# Patient Record
Sex: MALE | Employment: FULL TIME | URBAN - METROPOLITAN AREA
[De-identification: names, ages, dates, MRNs, and addresses within clinical notes are randomized per-mention and may not be internally consistent; named-entity substitution may affect disease eponyms.]

---

## 2017-04-13 ENCOUNTER — OFFICE VISIT (OUTPATIENT)
Dept: PEDIATRICS | Facility: CLINIC | Age: 32
End: 2017-04-13
Payer: COMMERCIAL

## 2017-04-13 VITALS
SYSTOLIC BLOOD PRESSURE: 110 MMHG | WEIGHT: 181.8 LBS | HEIGHT: 69 IN | HEART RATE: 80 BPM | DIASTOLIC BLOOD PRESSURE: 74 MMHG | RESPIRATION RATE: 20 BRPM | TEMPERATURE: 98.4 F | BODY MASS INDEX: 26.93 KG/M2

## 2017-04-13 DIAGNOSIS — Z00.00 ENCOUNTER FOR HEALTH MAINTENANCE EXAMINATION IN ADULT: Primary | ICD-10-CM

## 2017-04-13 DIAGNOSIS — R53.83 FATIGUE, UNSPECIFIED TYPE: ICD-10-CM

## 2017-04-13 DIAGNOSIS — F41.9 ANXIETY: ICD-10-CM

## 2017-04-13 LAB
ERYTHROCYTE [DISTWIDTH] IN BLOOD BY AUTOMATED COUNT: 12.8 % (ref 10–15)
HCT VFR BLD AUTO: 46.7 % (ref 40–53)
HGB BLD-MCNC: 16.2 G/DL (ref 13.3–17.7)
HIV 1+2 AB+HIV1 P24 AG SERPL QL IA: NORMAL
MCH RBC QN AUTO: 28.1 PG (ref 26.5–33)
MCHC RBC AUTO-ENTMCNC: 34.7 G/DL (ref 31.5–36.5)
MCV RBC AUTO: 81 FL (ref 78–100)
PLATELET # BLD AUTO: 200 10E9/L (ref 150–450)
RBC # BLD AUTO: 5.77 10E12/L (ref 4.4–5.9)
WBC # BLD AUTO: 5.4 10E9/L (ref 4–11)

## 2017-04-13 PROCEDURE — 80061 LIPID PANEL: CPT | Performed by: PEDIATRICS

## 2017-04-13 PROCEDURE — 87389 HIV-1 AG W/HIV-1&-2 AB AG IA: CPT | Performed by: PEDIATRICS

## 2017-04-13 PROCEDURE — 36415 COLL VENOUS BLD VENIPUNCTURE: CPT | Performed by: PEDIATRICS

## 2017-04-13 PROCEDURE — 87591 N.GONORRHOEAE DNA AMP PROB: CPT | Performed by: PEDIATRICS

## 2017-04-13 PROCEDURE — 80053 COMPREHEN METABOLIC PANEL: CPT | Performed by: PEDIATRICS

## 2017-04-13 PROCEDURE — 87491 CHLMYD TRACH DNA AMP PROBE: CPT | Performed by: PEDIATRICS

## 2017-04-13 PROCEDURE — 86780 TREPONEMA PALLIDUM: CPT | Performed by: PEDIATRICS

## 2017-04-13 PROCEDURE — 99385 PREV VISIT NEW AGE 18-39: CPT | Mod: GC | Performed by: INTERNAL MEDICINE

## 2017-04-13 PROCEDURE — 99213 OFFICE O/P EST LOW 20 MIN: CPT | Mod: 25 | Performed by: INTERNAL MEDICINE

## 2017-04-13 PROCEDURE — 85027 COMPLETE CBC AUTOMATED: CPT | Performed by: PEDIATRICS

## 2017-04-13 NOTE — LETTER
[facility]  [address] [facility] [address]                  [phone]   April 20, 2017    Peter Borden  [address]  [facility] [address]      Dear Peter,    Here is a summary of your recent test results:    Your STD testing is negative.    Your total cholesterol is elevated because your good cholesterol (HDL) is so high - this is good!    Your metabolic panel (electrolytes, liver and kidney function) is normal.    Your cell counts including your white count and hemoglobin are normal.    Your test results are enclosed.      Please contact me if you have any questions.           Thank you very much for choosing [facility]    Best regards,    Yanci Shelton MD        Results for orders placed or performed in visit on 04/13/17   Lipid Profile with reflex to direct LDL   Result Value Ref Range    Cholesterol 223 (H) <200 mg/dL    Triglycerides 149 <150 mg/dL    HDL Cholesterol 48 >39 mg/dL    LDL Cholesterol Calculated 145 (H) <100 mg/dL    Non HDL Cholesterol 175 (H) <130 mg/dL   Comprehensive metabolic panel (BMP + Alb, Alk Phos, ALT, AST, Total. Bili, TP)   Result Value Ref Range    Sodium 142 133 - 144 mmol/L    Potassium 4.6 3.4 - 5.3 mmol/L    Chloride 105 94 - 109 mmol/L    Carbon Dioxide 27 20 - 32 mmol/L    Anion Gap 10 3 - 14 mmol/L    Glucose 77 70 - 99 mg/dL    Urea Nitrogen 13 7 - 30 mg/dL    Creatinine 1.04 0.66 - 1.25 mg/dL    GFR Estimate 84 >60 mL/min/1.7m2    GFR Estimate If Black >90   GFR Calc   >60 mL/min/1.7m2    Calcium 9.6 8.5 - 10.1 mg/dL    Bilirubin Total 0.5 0.2 - 1.3 mg/dL    Albumin 4.6 3.4 - 5.0 g/dL    Protein Total 8.3 6.8 - 8.8 g/dL    Alkaline Phosphatase 50 40 - 150 U/L    ALT 20 0 - 70 U/L    AST 15 0 - 45 U/L   CBC with platelets   Result Value Ref Range    WBC 5.4 4.0 - 11.0 10e9/L    RBC Count 5.77 4.4 - 5.9 10e12/L    Hemoglobin 16.2 13.3 - 17.7 g/dL    Hematocrit 46.7 40.0 -  53.0 %    MCV 81 78 - 100 fl    MCH 28.1 26.5 - 33.0 pg    MCHC 34.7 31.5 - 36.5 g/dL    RDW 12.8 10.0 - 15.0 %    Platelet Count 200 150 - 450 10e9/L   HIV Antigen Antibody Combo   Result Value Ref Range    HIV Antigen Antibody Combo  NR     Nonreactive   HIV-1 p24 Ag & HIV-1/HIV-2 Ab Not Detected     Anti Treponema   Result Value Ref Range    Treponema pallidum Antibody Negative NEG   NEISSERIA GONORRHOEA PCR   Result Value Ref Range    Specimen Descrip Urine     N Gonorrhea PCR  NEG     Negative   Negative for N. gonorrhoeae rRNA by transcription mediated amplification.   A negative result by transcription mediated amplification does not preclude the   presence of N. gonorrhoeae infection because results are dependent on proper   and adequate collection, absence of inhibitors, and sufficient rRNA to be   detected.   Test delayed due to technical problems.     CHLAMYDIA TRACHOMATIS PCR   Result Value Ref Range    Specimen Description Urine     Chlamydia Trachomatis PCR  NEG     Negative   Negative for C. trachomatis rRNA by transcription mediated amplification.   A negative result by transcription mediated amplification does not preclude the   presence of C. trachomatis infection because results are dependent on proper   and adequate collection, absence of inhibitors, and sufficient rRNA to be   detected.   Test delayed due to technical problems.

## 2017-04-13 NOTE — NURSING NOTE
"Chief Complaint   Patient presents with     Physical       Initial /74  Pulse 80  Temp 98.4  F (36.9  C) (Oral)  Resp 20  Ht 5' 9\" (1.753 m)  Wt 181 lb 12.8 oz (82.5 kg)  BMI 26.85 kg/m2 Estimated body mass index is 26.85 kg/(m^2) as calculated from the following:    Height as of this encounter: 5' 9\" (1.753 m).    Weight as of this encounter: 181 lb 12.8 oz (82.5 kg).  Medication Reconciliation: complete   Claudia J, CMA,AAMA      "

## 2017-04-13 NOTE — PROGRESS NOTES
SUBJECTIVE:     CC: Peter Borden is an 31 year old male who presents for preventative health visit.     Physical   Annual:     Getting at least 3 servings of Calcium per day::  Yes    Bi-annual eye exam::  NO    Dental care twice a year::  NO    Sleep apnea or symptoms of sleep apnea::  None    Diet::  Regular (no restrictions)    Frequency of exercise::  None    Taking medications regularly::  Yes    Medication side effects::  None    Additional concerns today::  No          Other concerns to discuss in clinic today are as follow:    1.Not sleeping good at hs. Wakes up early  2. Tired all day due to poor sleep  - Can't sleep more than 5 hours  - Goes to bed at 11, wakes up at 5:30 am. Wakes up around 2 am every night, usually able to fall back asleep until 5:30.   - Starts work at 10 am, always feels sleepy  - When he wakes up, sometimes he feels his mind wanders to all of life plans, nervousness, etc but not always.   - Feels like he is sneezing every morning when he wakes up. Doesn't feel congested in the night or like it bothers him in the night  - Problem started about 5 months ago  - Previously had a gym membership, would usually work out after work.  Stopped this in November.  May have been helping his sleep  - Does not drink caffeine regularly (pop, coffee, etc)  - No TV before bed  - Pretty consistent with his schedule  - No alcohol   - Does snore, not sure how much, but will occasionally wake himself up from it.  Changes positions and thinks this makes it better.   - Does have frequent headaches, diffuse, no vision or other neuro changes     3. 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 Once he is able to get things organized, he feels much better  - Also nervous about general life goals.  Has had a lot of different jobs and is not currently working in his field of study, unsure what he wants to do.  May want to go into physical therapy which would be a whole new path.    - Also nervous as he doesn't have family here and didn't initially know many people, this is getting better       Today's PHQ-2 Score:   PHQ-2 ( 1999 [employer]) 4/13/2017   Little interest or pleasure in doing things Several days   Feeling down, depressed or hopeless Several days   PHQ-2 Score 2       Abuse: Current or Past(Physical, Sexual or Emotional)- No  Do you feel safe in your environment - Yes    Social History   Substance Use Topics     Smoking status: Never Smoker     Smokeless tobacco: Not on file     Alcohol use Yes      Comment: social     The patient does not drink >3 drinks per day nor >7 drinks per week.    Last PSA: No results found for: PSA    No results for input(s): CHOL, HDL, LDL, TRIG, CHOLHDLRATIO, NHDL in the last 55182 hours.    Reviewed orders with patient. Reviewed health maintenance and updated orders accordingly - Yes    Reviewed and updated as needed this visit by clinical staff  Tobacco  Allergies  Meds  Med Hx  Surg Hx  Fam Hx  Soc Hx        Reviewed and updated as needed this visit by Provider        Past Medical History:   Diagnosis Date     NO ACTIVE PROBLEMS       Past Surgical History:   Procedure Laterality Date     NO HISTORY OF SURGERY          No past medical history    No past surgical history    No regular medications. Takes ibuprofen prn. No herbs, supplements, vitamins.     No known allergies.     Social History  Originally from [place]. 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   [place]. 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   Wants to go into physical therapy.   Currently sexually active.  Female only. Do not  "use protection, no history of STIs.    Never tobacco user.  No marijuana, no other illicit drugs.   Social alcohol use, minimal.     ROS:  10 point ROS obtained, negative except as noted above in HPI     Problem list, Medication list, Allergies, and Medical/Social/Surgical histories reviewed in [facility] and updated as appropriate.  OBJECTIVE:     /74  Pulse 80  Temp 98.4  F (36.9  C) (Oral)  Resp 20  Ht 5' 9\" (1.753 m)  Wt 181 lb 12.8 oz (82.5 kg)  BMI 26.85 kg/m2  EXAM:  GENERAL: healthy, alert and no distress  HEENT: NC/AT, EOMI, no scleral icterus, PERRL, no conjunctivitis, MMM, no oral lesions. Tonsils 2+   NECK: no adenopathy, no asymmetry, masses, or scars and thyroid normal to palpation  RESP: lungs clear to auscultation - no rales, rhonchi or wheezes  CV: regular rate and rhythm, normal S1 S2, no S3 or S4, no murmur, click or rub, no peripheral edema and peripheral pulses strong  ABDOMEN: soft, nontender, no hepatosplenomegaly, no masses and bowel sounds normal  SKIN: No rashes or lesions   MS: no gross musculoskeletal defects noted, no edema  NEURO: Alert, oriented x 3, normal gait, normal speech, CNs intact  PSYCH: Normal mood and full congruent affect, not anxious, well groomed, normal speech and thought process    ASSESSMENT/PLAN:     (Z00.00) Encounter for health maintenance examination in adult  (primary encounter diagnosis)  Plan: Lipid Profile with reflex to direct LDL,         Comprehensive metabolic panel (BMP + Alb, Alk         Phos, ALT, AST, Total. Bili, TP), NEISSERIA         GONORRHOEA PCR, CHLAMYDIA TRACHOMATIS PCR, HIV         Antigen Antibody Combo, Anti Treponema    (R53.83) Fatigue, unspecified type  Comment: Has fairly good sleep hygiene but primary concern when awakening does seem to be anxiety which has also persisted during the daytime as well.  Does occasionally wake himself from sleep, with fairly normal oral airway and not obese, sleep apnea is less likely but discussed " "ruling it out with a sleep study if he felt inclined and he opted to have this scheduled.  He was worried about anemia but has no risk factors for this and believe this is less likely.  See management of anxiety below.  Believe regulasr exercise routine will help with anxiety in addition to counseling, which will all help with sleep.    Plan: CBC with platelets, SLEEP EVALUATION &         MANAGEMENT REFERRAL - ADULT           (F41.9) Anxiety  Comment: Discuss meds and/or therapy and/or importance of exercise and good sleep.  As this has never been a problem before and because it started after he got out of his normal workout routine, seems precipitated by recent job, we discussed first starting with getting back to regulsr exercise routine and considering counseling.  Discussed risk and benefits of meds and he chose to hold on this for now, which I think is reasonable. Will see back in follow up in about 4 weeks to see how this plan if working and if we need to start medication.    Plan: MENTAL HEALTH REFERRAL      COUNSELING:   Reviewed preventive health counseling, as reflected in patient instructions  Special attention given to:        Regular exercise       Healthy diet/nutrition       Safe sex practices/STD prevention       HIV screeninx in teen years, 1x in adult years, and at intervals if high risk         reports that he has never smoked. He does not have any smokeless tobacco history on file.    Estimated body mass index is 26.85 kg/(m^2) as calculated from the following:    Height as of this encounter: 5' 9\" (1.753 m).    Weight as of this encounter: 181 lb 12.8 oz (82.5 kg).       Counseling Resources:  ATP IV Guidelines  Pooled Cohorts Equation Calculator  FRAX Risk Assessment  ICSI Preventive Guidelines  Dietary Guidelines for Americans,   USDA's MyPlate  ASA Prophylaxis  Lung CA Screening    D/w Dr. Nikita Gonzalez MD  [facility]  Answers for HPI/ROS submitted by the " patient on 4/13/2017   Q1: Little interest or pleasure in doing things: 1=Several days  Q2: Feeling down, depressed or hopeless: 1=Several days  PHQ-2 Score: 2    Attestation:    I have seen above patient and reviewed the documentation from Dr. Gonzalez and discussed the findings with Dr. Gonzalez. I agree with the documentation of Dr. Gonzalez.    Yanci Shelton MD  Internal Medicine/Pediatrics  [facility]

## 2017-04-13 NOTE — PATIENT INSTRUCTIONS
1. For the sleep issues and fatigue -   - I am checking some basic labs including a hemoglobin which will look for anemia. Will call you with results.   - I placed a referral for a sleep study. They will call you to schedule.   - Try melatonin (over the counter) 3-6 mg (1-2 tabs usually) at night when you wake up or right before going to sleep at the beginning of the night    2. For the nervousness,  - Try to get back into a regular exercise routine  - I placed a referral for counseling to help with you a few coping stratetgies, sleep techniques, and to help sort out overall life goals.    - Let me know if this isn't working after a few weeks    3. For general physical  - I am checking for STDs  - I am checking your cholesterol   - I am checking basic kidney and liver labs  - Will call with results

## 2017-04-13 NOTE — MR AVS SNAPSHOT
After Visit Summary   4/13/2017    Peter Borden    MRN: [id number]           Patient Information     Date Of Birth          1985        Visit Information        Provider Department      4/13/2017 8:15 AM Shakila Gonzalez [facility]        Today's Diagnoses     Encounter for health maintenance examination in adult    -  1    Fatigue, unspecified type        Anxiety          Care Instructions    1. For the sleep issues and fatigue -   - I am checking some basic labs including a hemoglobin which will look for anemia. Will call you with results.   - I placed a referral for a sleep study. They will call you to schedule.   - Try melatonin (over the counter) 3-6 mg (1-2 tabs usually) at night when you wake up or right before going to sleep at the beginning of the night    2. For the nervousness,  - Try to get back into a regular exercise routine  - I placed a referral for counseling to help with you a few coping stratetgies, sleep techniques, and to help sort out overall life goals.    - Let me know if this isn't working after a few weeks    3. For general physical  - I am checking for STDs  - I am checking your cholesterol   - I am checking basic kidney and liver labs  - Will call with results         Follow-ups after your visit        Additional Services     MENTAL HEALTH REFERRAL       Your provider has referred you to: [facility]: [facility] - Counseling (Individual/Couples/Family) - [facility] [phone]   [email or web address]   *Patient will be contacted by [facility] scheduling partner[facility]), to schedule an appointment.  Patients may also call [facility] to schedule.    All scheduling is subject to the client's specific insurance plan & benefits, provider/location availability, and provider clinical specialities.  Please arrive 15 minutes early for your first appointment and  "bring your completed paperwork.    Please be aware that coverage of these services is subject to the terms and limitations of your health insurance plan.  Call member services at your health plan with any benefit or coverage questions.            SLEEP EVALUATION & MANAGEMENT REFERRAL - ADULT       Please be aware that coverage of these services is subject to the terms and limitations of your health insurance plan.  Call member services at your health plan with any benefit or coverage questions.      Please bring the following to your appointment:    >>   List of current medications   >>   This referral request   >>   Any documents/labs given to you for this referral    [facility] [phone] (Age 18 and up)                  Future tests that were ordered for you today     Open Future Orders        Priority Expected Expires Ordered    SLEEP EVALUATION & MANAGEMENT REFERRAL - ADULT Routine  4/13/2018 4/13/2017            Who to contact     If you have questions or need follow up information about today's clinic visit or your schedule please contact [facility] directly at [phone].  Normal or non-critical lab and imaging results will be communicated to you by Releadhart, letter or phone within 4 business days after the clinic has received the results. If you do not hear from us within 7 days, please contact the clinic through Releadhart or phone. If you have a critical or abnormal lab result, we will notify you by phone as soon as possible.  Submit refill requests through Empressr or call your pharmacy and they will forward the refill request to us. Please allow 3 business days for your refill to be completed.          Additional Information About Your Visit        Empressr Information     Empressr lets you send messages to your doctor, view your test results, renew your prescriptions, schedule appointments and more. To sign up, go to [email or web address] . Click on \"Log in\" on " "the left side of the screen, which will take you to the Welcome page. Then click on \"Sign up Now\" on the right side of the page.     You will be asked to enter the access code listed below, as well as some personal information. Please follow the directions to create your username and password.     Your access code is: [email or web address]  Expires: 2017  9:26 AM     Your access code will  in 90 days. If you need help or a new code, please call your [place] clinic or [phone].        Care EveryWhere ID     This is your Care EveryWhere ID. This could be used by other organizations to access your [place] medical records  [id number]        Your Vitals Were     Pulse Temperature Respirations Height BMI (Body Mass Index)       80 98.4  F (36.9  C) (Oral) 20 5' 9\" (1.753 m) 26.85 kg/m2        Blood Pressure from Last 3 Encounters:   17 110/74    Weight from Last 3 Encounters:   17 181 lb 12.8 oz (82.5 kg)              We Performed the Following     Anti Treponema     CBC with platelets     CHLAMYDIA TRACHOMATIS PCR     Comprehensive metabolic panel (BMP + Alb, Alk Phos, ALT, AST, Total. Bili, TP)     HIV Antigen Antibody Combo     Lipid Profile with reflex to direct LDL     MENTAL HEALTH REFERRAL     NEISSERIA GONORRHOEA PCR        Primary Care Provider    None Specified       No primary provider on file.        Thank you!     Thank you for choosing [facility]  for your care. Our goal is always to provide you with excellent care. Hearing back from our patients is one way we can continue to improve our services. Please take a few minutes to complete the written survey that you may receive in the mail after your visit with us. Thank you!             Your Updated Medication List - Protect others around you: Learn how to safely use, store and throw away your medicines at www.disposemymeds.org.      Notice  As of 2017  9:27 AM    You have not been prescribed any medications.      "

## 2017-04-14 LAB
ALBUMIN SERPL-MCNC: 4.6 G/DL (ref 3.4–5)
ALP SERPL-CCNC: 50 U/L (ref 40–150)
ALT SERPL W P-5'-P-CCNC: 20 U/L (ref 0–70)
ANION GAP SERPL CALCULATED.3IONS-SCNC: 10 MMOL/L (ref 3–14)
AST SERPL W P-5'-P-CCNC: 15 U/L (ref 0–45)
BILIRUB SERPL-MCNC: 0.5 MG/DL (ref 0.2–1.3)
BUN SERPL-MCNC: 13 MG/DL (ref 7–30)
CALCIUM SERPL-MCNC: 9.6 MG/DL (ref 8.5–10.1)
CHLORIDE SERPL-SCNC: 105 MMOL/L (ref 94–109)
CHOLEST SERPL-MCNC: 223 MG/DL
CO2 SERPL-SCNC: 27 MMOL/L (ref 20–32)
CREAT SERPL-MCNC: 1.04 MG/DL (ref 0.66–1.25)
GFR SERPL CREATININE-BSD FRML MDRD: 84 ML/MIN/1.7M2
GLUCOSE SERPL-MCNC: 77 MG/DL (ref 70–99)
HDLC SERPL-MCNC: 48 MG/DL
LDLC SERPL CALC-MCNC: 145 MG/DL
NONHDLC SERPL-MCNC: 175 MG/DL
POTASSIUM SERPL-SCNC: 4.6 MMOL/L (ref 3.4–5.3)
PROT SERPL-MCNC: 8.3 G/DL (ref 6.8–8.8)
SODIUM SERPL-SCNC: 142 MMOL/L (ref 133–144)
T PALLIDUM IGG+IGM SER QL: NEGATIVE
TRIGL SERPL-MCNC: 149 MG/DL

## 2017-04-14 ASSESSMENT — PATIENT HEALTH QUESTIONNAIRE - PHQ9: SUM OF ALL RESPONSES TO PHQ QUESTIONS 1-9: 8

## 2017-04-17 ENCOUNTER — TELEPHONE (OUTPATIENT)
Dept: PEDIATRICS | Facility: CLINIC | Age: 32
End: 2017-04-17

## 2017-04-17 NOTE — TELEPHONE ENCOUNTER
Reason for Call:  Request for results:    Name of test or procedure: CBC, Lipids, STD testing    Date of test of procedure: 4/13/17    Location of the test or procedure:  [place] to leave the result message on voice mail or with a family member? YES    Phone number Patient can be reached at:  Home number on file [phone] (home)    Additional comments: Please call patient with results    Mary Meredith,   [facility]

## 2017-04-18 LAB
C TRACH DNA SPEC QL NAA+PROBE: NORMAL
N GONORRHOEA DNA SPEC QL NAA+PROBE: NORMAL
SPECIMEN SOURCE: NORMAL
SPECIMEN SOURCE: NORMAL

## 2017-05-22 PROBLEM — F41.9 ANXIETY: Status: ACTIVE | Noted: 2017-05-22

## 2017-12-14 ENCOUNTER — OFFICE VISIT (OUTPATIENT)
Dept: PEDIATRICS | Facility: CLINIC | Age: 32
End: 2017-12-14
Payer: COMMERCIAL

## 2017-12-14 VITALS
TEMPERATURE: 98 F | DIASTOLIC BLOOD PRESSURE: 66 MMHG | WEIGHT: 172.3 LBS | BODY MASS INDEX: 25.52 KG/M2 | HEIGHT: 69 IN | HEART RATE: 65 BPM | SYSTOLIC BLOOD PRESSURE: 118 MMHG | OXYGEN SATURATION: 99 %

## 2017-12-14 DIAGNOSIS — N50.812 TESTICULAR PAIN, LEFT: ICD-10-CM

## 2017-12-14 DIAGNOSIS — Z23 NEED FOR PROPHYLACTIC VACCINATION AND INOCULATION AGAINST INFLUENZA: Primary | ICD-10-CM

## 2017-12-14 LAB
ALBUMIN UR-MCNC: NEGATIVE MG/DL
APPEARANCE UR: CLEAR
BILIRUB UR QL STRIP: NEGATIVE
COLOR UR AUTO: YELLOW
GLUCOSE UR STRIP-MCNC: NEGATIVE MG/DL
HGB UR QL STRIP: NEGATIVE
KETONES UR STRIP-MCNC: NEGATIVE MG/DL
LEUKOCYTE ESTERASE UR QL STRIP: NEGATIVE
NITRATE UR QL: NEGATIVE
PH UR STRIP: 8 PH (ref 5–7)
SOURCE: ABNORMAL
SP GR UR STRIP: 1.01 (ref 1–1.03)
UROBILINOGEN UR STRIP-ACNC: 0.2 EU/DL (ref 0.2–1)

## 2017-12-14 PROCEDURE — 90686 IIV4 VACC NO PRSV 0.5 ML IM: CPT | Performed by: INTERNAL MEDICINE

## 2017-12-14 PROCEDURE — 87491 CHLMYD TRACH DNA AMP PROBE: CPT | Performed by: PEDIATRICS

## 2017-12-14 PROCEDURE — 87591 N.GONORRHOEAE DNA AMP PROB: CPT | Performed by: PEDIATRICS

## 2017-12-14 PROCEDURE — 90471 IMMUNIZATION ADMIN: CPT | Performed by: INTERNAL MEDICINE

## 2017-12-14 PROCEDURE — 99213 OFFICE O/P EST LOW 20 MIN: CPT | Mod: GE | Performed by: STUDENT IN AN ORGANIZED HEALTH CARE EDUCATION/TRAINING PROGRAM

## 2017-12-14 PROCEDURE — 87389 HIV-1 AG W/HIV-1&-2 AB AG IA: CPT | Performed by: PEDIATRICS

## 2017-12-14 PROCEDURE — 81003 URINALYSIS AUTO W/O SCOPE: CPT | Performed by: INTERNAL MEDICINE

## 2017-12-14 PROCEDURE — 36415 COLL VENOUS BLD VENIPUNCTURE: CPT | Performed by: PEDIATRICS

## 2017-12-14 NOTE — MR AVS SNAPSHOT
After Visit Summary   12/14/2017    Peter Borden    MRN: [id number]           Patient Information     Date Of Birth          1985        Visit Information        Provider Department      12/14/2017 9:15 AM Taco Smith MD [facility]        Today's Diagnoses     Need for prophylactic vaccination and inoculation against influenza    -  1    Testicular pain, left          Care Instructions    - HIV, gonorrhea, and chlamydia test have been ordered; for gonorrhea and chlamydia test, please wait another 30 minutes to obtain the dirty urine sample  - will call or message you with lab results  - monitor left testicular pain for next 2-3 weeks; if not improving, might have to consider ultrasound or re-evaluate at clinic  - can take tylenol or ibuprofen as needed for pain      Testicular Pain, Unclear Cause  You have had pain in one or both testicles. Based on your exam today, the exact cause of your pain is not certain. But your condition does not appear to be dangerous. Testicles are very sensitive. Even a small injury can cause quite a bit of pain. Other possible causes of testicular pain include kidney stones, cysts, mumps, inflammatory conditions, chronic conditions, hernia, infection, and a twisted testicle.  Certain tests may be done to rule out an underlying problem causing the pain. Nothing conclusive was found today. Most likely, the pain will go away on its own. If it doesn t, you may need more tests.    Home care  Medicine may be prescribed to help relieve pain and swelling. This may be an over-the-counter pain reliever or prescription pain medication. Take all medicine as directed.  The following are general care guidelines:    To relieve pain and swelling, apply an ice pack wrapped in a thin towel for 10 minutes at a time. Continue this on and off for 1 to 2 days.    When lying down, place a small rolled towel under your scrotum. When moving around, wear a jockstrap  (athletic supporter) or supportive underwear. These will help support and protect your testicles.    If it hurts to walk, walk as little as possible until you feel better.    Avoid strenuous activity until you feel better.    Do not have sex until you feel better.    If you have severe pain in the testicle, seek care right away. Delay may lead to permanent loss of the testicle s function.  Follow-up care  Follow up with your healthcare provider, or as advised.  When to seek medical advice  Call your healthcare provider right away if any of these occur:    Fever of 100.4 F (38 C) or higher    Worsening of the pain or severe pain    Swelling of the testicle or scrotum    A lump in the scrotum    Warm and red scrotum (signs of infection)    Nausea and vomiting    Pain or swelling in abdomen    Trouble urinating    Numbness or weakness in the leg    Shrinking of the testicle    Blood in your urine  Date Last Reviewed: 10/1/2016    2448-7228 [employer]. [address] [place] [address]. All rights reserved. This information is not intended as a substitute for professional medical care. Always follow your healthcare professional's instructions.                Follow-ups after your visit        Follow-up notes from your care team     Return if symptoms worsen or fail to improve.      Who to contact     If you have questions or need follow up information about today's clinic visit or your schedule please contact [facility] directly at [phone].  Normal or non-critical lab and imaging results will be communicated to you by MyChart, letter or phone within 4 business days after the clinic has received the results. If you do not hear from us within 7 days, please contact the clinic through MyChart or phone. If you have a critical or abnormal lab result, we will notify you by phone as soon as possible.  Submit refill requests through [employer] or call your pharmacy and they will forward the  "refill request to us. Please allow 3 business days for your refill to be completed.          Additional Information About Your Visit        MyChart Information     [employer] lets you send messages to your doctor, view your test results, renew your prescriptions, schedule appointments and more. To sign up, go to [email or web address] . Click on \"Log in\" on the left side of the screen, which will take you to the Welcome page. Then click on \"Sign up Now\" on the right side of the page.     You will be asked to enter the access code listed below, as well as some personal information. Please follow the directions to create your username and password.     Your access code is: [email or web address]  Expires: 3/14/2018 10:15 AM     Your access code will  in 90 days. If you need help or a new code, please call your [facility] clinic or [phone].        Care EveryWhere ID     This is your Care EveryWhere ID. This could be used by other organizations to access your [facility] medical records  [id number]        Your Vitals Were     Pulse Temperature Height Pulse Oximetry BMI (Body Mass Index)       65 98  F (36.7  C) (Oral) 5' 9\" (1.753 m) 99% 25.44 kg/m2        Blood Pressure from Last 3 Encounters:   17 118/66   17 110/74    Weight from Last 3 Encounters:   17 172 lb 4.8 oz (78.2 kg)   17 181 lb 12.8 oz (82.5 kg)              We Performed the Following     CHLAMYDIA TRACHOMATIS PCR     FLU VAC, SPLIT VIRUS IM > 3 YO (QUADRIVALENT) [74727]     HIV Antigen Antibody Combo     NEISSERIA GONORRHOEA PCR     UA reflex to Microscopic and Culture     Vaccine Administration, Initial [[id number]]        Primary Care Provider Office Phone # Fax #    Yanci Shelton [phone] [place] [phone]        Equal Access to Services     [facility] SHANKAR AH: Hadii aad ku hadasho Soomaali, waaxda luqadaha, qaybta kaalmada sarahyakeya, farooq awad ah. So [facility] " [phone].    ATENCIÓN: Si habla yonis, tiene a martins disposición servicios gratuitos de asistencia lingüística. Llame al [phone].    We comply with applicable federal civil rights laws and [place] laws. We do not discriminate on the basis of race, color, national origin, age, disability, sex, sexual orientation, or gender identity.            Thank you!     Thank you for choosing [facility] [place]  for your care. Our goal is always to provide you with excellent care. Hearing back from our patients is one way we can continue to improve our services. Please take a few minutes to complete the written survey that you may receive in the mail after your visit with us. Thank you!             Your Updated Medication List - Protect others around you: Learn how to safely use, store and throw away your medicines at www.disposemymeds.org.      Notice  As of 12/14/2017 10:15 AM    You have not been prescribed any medications.

## 2017-12-14 NOTE — NURSING NOTE
"Chief Complaint   Patient presents with     Testicular/scrotal Pain       Initial /66  Pulse 65  Temp 98  F (36.7  C) (Oral)  Ht 5' 9\" (1.753 m)  Wt 172 lb 4.8 oz (78.2 kg)  SpO2 99%  BMI 25.44 kg/m2 Estimated body mass index is 25.44 kg/(m^2) as calculated from the following:    Height as of this encounter: 5' 9\" (1.753 m).    Weight as of this encounter: 172 lb 4.8 oz (78.2 kg).  Medication Reconciliation: complete   Marisa Rowland MA      "

## 2017-12-14 NOTE — PROGRESS NOTES
SUBJECTIVE:   Peter Borden is a 32 year old male who presents to clinic today for the following health issues:      Testicular Pain      Duration: 1-2 weeks     Description (location/character/radiation): Left testicular    Intensity:  moderate    Accompanying signs and symptoms: discomfort, left side groin and around the back, prolonged standing causes pain, tender to touch,     History (similar episodes/previous evaluation): None    Precipitating or alleviating factors: prolonged standing causes pain    Therapies tried and outcome: Took Amoxicillin      Peter is a   Left testicular pain since 2 weeks ago, after gym abdominal exercise, located in left testicular and radiate to left side groin and around the back, crampy characteristics, getting better after sexual encounter 1 week ago, has been taking amoxicillin (10 days starting 2 weeks ago from previously prescribed), no change in pain with position, no other meds, no past STI, uses condom regularly, no fever, no rash, no  or GI symtpoms    Problem list and histories reviewed & adjusted, as indicated.  Additional history: as documented    Patient Active Problem List   Diagnosis     Anxiety     Past Surgical History:   Procedure Laterality Date     NO HISTORY OF SURGERY         Social History   Substance Use Topics     Smoking status: Never Smoker     Smokeless tobacco: Never Used     Alcohol use Yes      Comment: social, minimal      History reviewed. No pertinent family history.      No current outpatient prescriptions on file.     No Known Allergies      Reviewed and updated as needed this visit by clinical staffTobacco  Allergies  Meds  Problems  Med Hx  Surg Hx  Fam Hx  Soc Hx        Reviewed and updated as needed this visit by Provider  Allergies  Meds  Problems         ROS:  C: NEGATIVE for fever  I: NEGATIVE for worrisome rashes  GI: NEGATIVE for nausea, abdominal pain, heartburn, or change in bowel habits  : NEGATIVE for dysuria or  "hematuria    OBJECTIVE:     /66  Pulse 65  Temp 98  F (36.7  C) (Oral)  Ht 5' 9\" (1.753 m)  Wt 172 lb 4.8 oz (78.2 kg)  SpO2 99%  BMI 25.44 kg/m2  Body mass index is 25.44 kg/(m^2).     GENERAL: healthy, alert and no distress  EYES: Eyes grossly normal to inspection, conjunctivae and sclerae normal  ABDOMEN: soft, nontender abdomen  : No femoral or inguinal hernia, anterior scrotal mild tenderness on left side but otherwise non-tender right scrotum, no epididymal tenderness, no penile discharge,   MS: no gross musculoskeletal defects noted, no edema  SKIN: no suspicious lesions or rashes  NEURO: Normal strength and tone, mentation intact and speech normal  PSYCH: mentation appears normal, affect normal/bright    Diagnostic Test Results:  Urinalysis - negative    ASSESSMENT/PLAN:   1. Need for prophylactic vaccination and inoculation against influenza  - FLU VAC, SPLIT VIRUS IM > 3 YO (QUADRIVALENT) [[id number]]  - Vaccine Administration, Initial [[id number]]    2. Testicular pain, left  Complains of 2 wk hx of left testicular pain soon after abdominal exercises. Now slowly improving. Physical exam shows very mild anterior scrotal tenderness on left side but otherwise benign. Differentials include inguinal hernia, hydrocele, epididymitis, renal stone, STI, testicular torsion, etc. His history and exam findings are not concerning for emergent conditions such as testicular torsion or hernia. Although small hernia could be possible especially with the history of abdominal exercises prior to onset of symptoms. He is sexually active so epididymitis is possible but UA was clean. Will need to rule out infectious etiology. Will hold off on US since not consistent with testicular torsion but should be re-considered if not improving  - UA reflex to Microscopic and Culture  - HIV Antigen Antibody Combo  - NEISSERIA GONORRHOEA PCR  - CHLAMYDIA TRACHOMATIS PCR    FUTURE APPOINTMENTS:       - Follow-up visit as needed if " symptoms not improving    Taco Smith MD  [facility]      Injectable Influenza Immunization Documentation    1.  Is the person to be vaccinated sick today?   No    2. Does the person to be vaccinated have an allergy to a component   of the vaccine?   No  Egg Allergy Algorithm Link    3. Has the person to be vaccinated ever had a serious reaction   to influenza vaccine in the past?   No    4. Has the person to be vaccinated ever had Guillain-Barré syndrome?   No    Form completed by Marisa Rowland MA         Attestation:    I have reviewed the documentation from Dr. Smith and discussed the findings with Dr. Smith. I agree with the documentation of Dr. Smith.    Yanci Shelton MD  Internal Medicine/Pediatrics  [facility]

## 2017-12-14 NOTE — PATIENT INSTRUCTIONS
- HIV, gonorrhea, and chlamydia test have been ordered; for gonorrhea and chlamydia test, please wait another 30 minutes to obtain the dirty urine sample  - will call or message you with lab results  - monitor left testicular pain for next 2-3 weeks; if not improving, might have to consider ultrasound or re-evaluate at clinic  - can take tylenol or ibuprofen as needed for pain      Testicular Pain, Unclear Cause  You have had pain in one or both testicles. Based on your exam today, the exact cause of your pain is not certain. But your condition does not appear to be dangerous. Testicles are very sensitive. Even a small injury can cause quite a bit of pain. Other possible causes of testicular pain include kidney stones, cysts, mumps, inflammatory conditions, chronic conditions, hernia, infection, and a twisted testicle.  Certain tests may be done to rule out an underlying problem causing the pain. Nothing conclusive was found today. Most likely, the pain will go away on its own. If it doesn t, you may need more tests.    Home care  Medicine may be prescribed to help relieve pain and swelling. This may be an over-the-counter pain reliever or prescription pain medication. Take all medicine as directed.  The following are general care guidelines:    To relieve pain and swelling, apply an ice pack wrapped in a thin towel for 10 minutes at a time. Continue this on and off for 1 to 2 days.    When lying down, place a small rolled towel under your scrotum. When moving around, wear a jockstrap (athletic supporter) or supportive underwear. These will help support and protect your testicles.    If it hurts to walk, walk as little as possible until you feel better.    Avoid strenuous activity until you feel better.    Do not have sex until you feel better.    If you have severe pain in the testicle, seek care right away. Delay may lead to permanent loss of the testicle s function.  Follow-up care  Follow up with your healthcare  provider, or as advised.  When to seek medical advice  Call your healthcare provider right away if any of these occur:    Fever of 100.4 F (38 C) or higher    Worsening of the pain or severe pain    Swelling of the testicle or scrotum    A lump in the scrotum    Warm and red scrotum (signs of infection)    Nausea and vomiting    Pain or swelling in abdomen    Trouble urinating    Numbness or weakness in the leg    Shrinking of the testicle    Blood in your urine  Date Last Reviewed: 10/1/2016    3166-3898 [employer]. [address] [place] [address]. All rights reserved. This information is not intended as a substitute for professional medical care. Always follow your healthcare professional's instructions.

## 2017-12-15 LAB
C TRACH DNA SPEC QL NAA+PROBE: NEGATIVE
HIV 1+2 AB+HIV1 P24 AG SERPL QL IA: NONREACTIVE
N GONORRHOEA DNA SPEC QL NAA+PROBE: NEGATIVE
SPECIMEN SOURCE: NORMAL
SPECIMEN SOURCE: NORMAL

## 2019-02-01 ENCOUNTER — OFFICE VISIT (OUTPATIENT)
Dept: PEDIATRICS | Facility: CLINIC | Age: 34
End: 2019-02-01
Payer: COMMERCIAL

## 2019-02-01 VITALS
TEMPERATURE: 96.7 F | DIASTOLIC BLOOD PRESSURE: 70 MMHG | WEIGHT: 177.7 LBS | HEART RATE: 75 BPM | HEIGHT: 70 IN | SYSTOLIC BLOOD PRESSURE: 116 MMHG | BODY MASS INDEX: 25.44 KG/M2 | OXYGEN SATURATION: 99 %

## 2019-02-01 DIAGNOSIS — Z00.00 ENCOUNTER FOR ROUTINE HISTORY AND PHYSICAL EXAMINATION OF ADULT: Primary | ICD-10-CM

## 2019-02-01 PROBLEM — F41.9 ANXIETY: Status: RESOLVED | Noted: 2017-05-22 | Resolved: 2019-02-01

## 2019-02-01 PROCEDURE — 90715 TDAP VACCINE 7 YRS/> IM: CPT | Performed by: INTERNAL MEDICINE

## 2019-02-01 PROCEDURE — 99395 PREV VISIT EST AGE 18-39: CPT | Mod: 25 | Performed by: INTERNAL MEDICINE

## 2019-02-01 PROCEDURE — 90471 IMMUNIZATION ADMIN: CPT | Performed by: INTERNAL MEDICINE

## 2019-02-01 ASSESSMENT — ENCOUNTER SYMPTOMS
SORE THROAT: 0
HEMATOCHEZIA: 0
FEVER: 0
DIARRHEA: 0
DIZZINESS: 0
PALPITATIONS: 0
NAUSEA: 0
JOINT SWELLING: 0
WEAKNESS: 0
NERVOUS/ANXIOUS: 0
MYALGIAS: 0
SHORTNESS OF BREATH: 0
ARTHRALGIAS: 0
HEMATURIA: 0
FREQUENCY: 0
HEADACHES: 0
HEARTBURN: 0
CHILLS: 0
EYE PAIN: 0
CONSTIPATION: 0
PARESTHESIAS: 0
COUGH: 0
DYSURIA: 0
ABDOMINAL PAIN: 0

## 2019-02-01 ASSESSMENT — MIFFLIN-ST. JEOR: SCORE: 1754.35

## 2019-02-01 NOTE — PROGRESS NOTES
SUBJECTIVE:   CC: Peter Borden is an 33 year old male who presents for preventative health visit.     Physical   Annual:     Getting at least 3 servings of Calcium per day:  NO    Bi-annual eye exam:  NO    Dental care twice a year:  NO    Sleep apnea or symptoms of sleep apnea:  None    Diet:  Regular (no restrictions)    Frequency of exercise:  2-3 days/week    Duration of exercise:  30-45 minutes    Taking medications regularly:  Yes    Medication side effects:  None    Additional concerns today:  No    PHQ-2 Total Score: 1    Not sure about tetanus shot.  [place] in [place].  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  Thinks had all shots before came here.  Not sure where records are.        Today's PHQ-2 Score:   PHQ-2 ( 1999 Pfizer) 2/1/2019   Q1: Little interest or pleasure in doing things 1   Q2: Feeling down, depressed or hopeless 0   PHQ-2 Score 1   Q1: Little interest or pleasure in doing things Several days   Q2: Feeling down, depressed or hopeless Not at all   PHQ-2 Score 1       Abuse: Current or Past(Physical, Sexual or Emotional)- No  Do you feel safe in your environment? Yes    Social History     Tobacco Use     Smoking status: Never Smoker     Smokeless tobacco: Never Used   Substance Use Topics     Alcohol use: Yes     Comment: social, minimal      Alcohol Use 2/1/2019   If you drink alcohol do you typically have greater than 3 drinks per day OR greater than 7 drinks per week? No       Last PSA: No results found for: PSA    Reviewed orders with patient. Reviewed health maintenance and updated orders accordingly - Yes  Labs reviewed in EPIC    Reviewed and updated as needed this visit by clinical staff  Tobacco  Allergies  Meds  Problems  Med Hx  Surg Hx  Fam Hx  Soc Hx          Reviewed and updated as needed this visit by Provider  Tobacco  Allergies  Meds  Problems  Med Hx  Surg Hx  Fam Hx  Soc Hx             Review of Systems   Constitutional: Negative for chills  "and fever.   HENT: Negative for congestion, ear pain, hearing loss and sore throat.    Eyes: Negative for pain and visual disturbance.   Respiratory: Negative for cough and shortness of breath.    Cardiovascular: Negative for chest pain, palpitations and peripheral edema.   Gastrointestinal: Negative for abdominal pain, constipation, diarrhea, heartburn, hematochezia and nausea.   Genitourinary: Negative for discharge, dysuria, frequency, genital sores, hematuria, impotence and urgency.   Musculoskeletal: Negative for arthralgias, joint swelling and myalgias.   Skin: Negative for rash.   Neurological: Negative for dizziness, weakness, headaches and paresthesias.   Psychiatric/Behavioral: Negative for mood changes. The patient is not nervous/anxious.          OBJECTIVE:   /70 (BP Location: Right arm, Patient Position: Chair, Cuff Size: Adult Regular)   Pulse 75   Temp 96.7  F (35.9  C) (Tympanic)   Ht 1.765 m (5' 9.5\")   Wt 80.6 kg (177 lb 11.2 oz)   SpO2 99%   BMI 25.87 kg/m      Physical Exam  GENERAL: healthy, alert and no distress  EYES: Eyes grossly normal to inspection, PERRL and conjunctivae and sclerae normal  HENT: ear canals and TM's normal, nose and mouth without ulcers or lesions  NECK: no adenopathy, no asymmetry, masses, or scars and thyroid normal to palpation  RESP: lungs clear to auscultation - no rales, rhonchi or wheezes  CV: regular rate and rhythm, normal S1 S2, no S3 or S4, no murmur, click or rub, no peripheral edema and peripheral pulses strong  ABDOMEN: soft, nontender, no hepatosplenomegaly, no masses and bowel sounds normal  MS: no gross musculoskeletal defects noted, no edema  SKIN: no suspicious lesions or rashes  NEURO: Normal strength and tone, mentation intact and speech normal  PSYCH: mentation appears normal, affect normal/bright    Diagnostic Test Results:  none     ASSESSMENT/PLAN:   1. Encounter for routine history and physical examination of adult  Routine health " "education discussed: calcium, diet, exercise, weight, safety.  Declines STD check today. Counseling provided regarding the benefits and risks related to the vaccines ordered today. I reviewed the signs and symptoms of adverse effects and when to seek medical care if they should arise.           COUNSELING:   Reviewed preventive health counseling, as reflected in patient instructions    BP Readings from Last 1 Encounters:   02/01/19 116/70     Estimated body mass index is 25.87 kg/m  as calculated from the following:    Height as of this encounter: 1.765 m (5' 9.5\").    Weight as of this encounter: 80.6 kg (177 lb 11.2 oz).      Weight management plan: Discussed healthy diet and exercise guidelines     reports that  has never smoked. he has never used smokeless tobacco.      Counseling Resources:  ATP IV Guidelines  Pooled Cohorts Equation Calculator  FRAX Risk Assessment  ICSI Preventive Guidelines  Dietary Guidelines for Americans, 2010  USDA's MyPlate  ASA Prophylaxis  Lung CA Screening    Shavon Huizar MD  [facility] [place]  "

## 2019-02-01 NOTE — PATIENT INSTRUCTIONS
Preventive Health Recommendations  Male Ages 26 - 39    Yearly exam:             See your health care provider every year in order to  o   Review health changes.   o   Discuss preventive care.    o   Review your medicines if your doctor has prescribed any.    You should be tested each year for STDs (sexually transmitted diseases), if you re at risk.       Have your cholesterol recheck when you are 40.  Shots: Get a flu shot each year. Get a tetanus shot every 10 years.     Nutrition:    Eat at least 5 servings of fruits and vegetables daily.     Eat whole-grain bread, whole-wheat pasta and brown rice instead of white grains and rice.     Get adequate Calcium and Vitamin D.     Lifestyle    Exercise for at least 150 minutes a week (30 minutes a day, 5 days a week). This will help you control your weight and prevent disease.     Limit alcohol to one drink per day.     No smoking.     Wear sunscreen to prevent skin cancer.     See your dentist every six months for an exam and cleaning.

## 2019-03-22 ENCOUNTER — OFFICE VISIT (OUTPATIENT)
Dept: PEDIATRICS | Facility: CLINIC | Age: 34
End: 2019-03-22
Payer: COMMERCIAL

## 2019-03-22 VITALS
SYSTOLIC BLOOD PRESSURE: 108 MMHG | DIASTOLIC BLOOD PRESSURE: 68 MMHG | TEMPERATURE: 98.5 F | WEIGHT: 180.8 LBS | OXYGEN SATURATION: 97 % | HEIGHT: 70 IN | HEART RATE: 68 BPM | BODY MASS INDEX: 25.88 KG/M2

## 2019-03-22 DIAGNOSIS — Z11.3 SCREEN FOR STD (SEXUALLY TRANSMITTED DISEASE): Primary | ICD-10-CM

## 2019-03-22 DIAGNOSIS — J06.9 VIRAL URI: ICD-10-CM

## 2019-03-22 PROCEDURE — 87491 CHLMYD TRACH DNA AMP PROBE: CPT | Performed by: PHYSICIAN ASSISTANT

## 2019-03-22 PROCEDURE — 99214 OFFICE O/P EST MOD 30 MIN: CPT | Performed by: PHYSICIAN ASSISTANT

## 2019-03-22 PROCEDURE — 86706 HEP B SURFACE ANTIBODY: CPT | Performed by: PHYSICIAN ASSISTANT

## 2019-03-22 PROCEDURE — 0064U ANTB TP TOTAL&RPR IA QUAL: CPT | Performed by: PHYSICIAN ASSISTANT

## 2019-03-22 PROCEDURE — 87591 N.GONORRHOEAE DNA AMP PROB: CPT | Performed by: PHYSICIAN ASSISTANT

## 2019-03-22 PROCEDURE — 87389 HIV-1 AG W/HIV-1&-2 AB AG IA: CPT | Performed by: PHYSICIAN ASSISTANT

## 2019-03-22 PROCEDURE — 87340 HEPATITIS B SURFACE AG IA: CPT | Performed by: PHYSICIAN ASSISTANT

## 2019-03-22 PROCEDURE — 36415 COLL VENOUS BLD VENIPUNCTURE: CPT | Performed by: PHYSICIAN ASSISTANT

## 2019-03-22 ASSESSMENT — MIFFLIN-ST. JEOR: SCORE: 1768.41

## 2019-03-22 NOTE — PROGRESS NOTES
"  SUBJECTIVE:   Peter Borden is a 33 year old male who presents to clinic today for the following health issues:    Patient states that he has illness. It happened last week and symptoms included fevers, body aches. He had left over amox and took five days.   No history of STDs.   No urinary symptoms or discharge.   Fiancee. One partner. Female partner.   No protection.   Unsure if he has had hep b vaccinations.    ROS:  Otherwise NEGATIVE     OBJECTIVE:                                                    /68 (BP Location: Right arm, Patient Position: Sitting, Cuff Size: Adult Regular)   Pulse 68   Temp 98.5  F (36.9  C) (Tympanic)   Ht 1.765 m (5' 9.5\")   Wt 82 kg (180 lb 12.8 oz)   SpO2 97%   BMI 26.32 kg/m    Body mass index is 26.32 kg/m .   GENERAL: alert, no distress  HENT: ear canals- normal; TMs- normal; Nose- normal; Mouth- no ulcers, no lesions  NECK: no tenderness, no adenopathy  RESP: lungs clear to auscultation - no rales, no rhonchi, no wheezes  CV: regular rates and rhythm, normal S1 S2, no S3 or S4 and no murmur, no click or rub  ABDOMEN: soft, no tenderness    Diagnostic test results:  No results found for this or any previous visit (from the past 24 hour(s)).     ASSESSMENT/PLAN:                                                    (Z11.3) Screen for STD (sexually transmitted disease)  (primary encounter diagnosis)  Comment: will contact patient with results.  Plan: NEISSERIA GONORRHOEA PCR, CHLAMYDIA TRACHOMATIS        PCR, HIV Antigen Antibody Combo, Treponema Abs         w Reflex to RPR and Titer, Hepatitis B surface         antigen, Hepatitis B Surface Antibody            (J06.9) Viral URI  Comment: resolved.  Plan:       Palma Klein PA-C  [facility] [place]  "

## 2019-03-24 LAB
C TRACH DNA SPEC QL NAA+PROBE: NEGATIVE
HBV SURFACE AB SERPL IA-ACNC: 0.31 M[IU]/ML
HBV SURFACE AG SERPL QL IA: NONREACTIVE
HIV 1+2 AB+HIV1 P24 AG SERPL QL IA: NONREACTIVE
N GONORRHOEA DNA SPEC QL NAA+PROBE: NEGATIVE
SPECIMEN SOURCE: NORMAL
SPECIMEN SOURCE: NORMAL
T PALLIDUM AB SER QL: NONREACTIVE

## 2019-11-11 ENCOUNTER — TELEPHONE (OUTPATIENT)
Dept: PEDIATRICS | Facility: CLINIC | Age: 34
End: 2019-11-11

## 2019-11-11 NOTE — TELEPHONE ENCOUNTER
TC - Please help pt to schedule an appointment with any provider in any of the [facility] on Friday(his day off) for an assessment. He is available at [phone]. Thanks.     Pt states the following:     - has been experiencing chest pain(moderate tightness located in L chest) off & on for months, lately getting more frequently  - has mild to moderate pain/tightness in L chest area lasting only for secs to minutes at a time  - used to have 1-2 episodes every 1-2 weeks, now 1-2 episodes every week  - doesn't happen with activity or daily basis  - denies severe chest pain/tightness, palpitations, PORRAS, HA, SOB, vision changes, difficulty breathing, weakness in one side of face/body, diaphoresis, lethargy, vomiting, nausea, pain radiating to jaw/shoulder/hand or dehydration sx's  - not on any med  - no hx of GERD - doesn't have GERD sx's    Wants to come in this Friday(his day off) to get this checked. Advised pt that no appointment available for this Friday, not appropriate for UC. Pt is willing to see any provider in [facility].     Advised pt to monitor sx's, if sx's get worse he is aware that he need to go to [facility] RN  Triage Nurse

## 2019-11-12 NOTE — TELEPHONE ENCOUNTER
TC - please help pt to schedule an appointment with any one of the providers in ANY [facility] CLINICS - doesn't have to be pcp/[facility]. Thanks.     Alejandrina, RN  Triage Nurse

## 2019-11-12 NOTE — TELEPHONE ENCOUNTER
Left message to schedule appt at [facility] for 11/15/19. Also sent MyChart message to patient.

## 2019-11-25 ENCOUNTER — OFFICE VISIT (OUTPATIENT)
Dept: FAMILY MEDICINE | Facility: CLINIC | Age: 34
End: 2019-11-25
Payer: COMMERCIAL

## 2019-11-25 VITALS
RESPIRATION RATE: 16 BRPM | DIASTOLIC BLOOD PRESSURE: 70 MMHG | SYSTOLIC BLOOD PRESSURE: 110 MMHG | TEMPERATURE: 99.3 F | BODY MASS INDEX: 25.34 KG/M2 | HEART RATE: 73 BPM | WEIGHT: 177 LBS | HEIGHT: 70 IN | OXYGEN SATURATION: 98 %

## 2019-11-25 DIAGNOSIS — R10.13 DYSPEPSIA: Primary | ICD-10-CM

## 2019-11-25 PROCEDURE — 99213 OFFICE O/P EST LOW 20 MIN: CPT | Performed by: FAMILY MEDICINE

## 2019-11-25 RX ORDER — OMEPRAZOLE 40 MG/1
40 CAPSULE, DELAYED RELEASE ORAL DAILY
Qty: 30 CAPSULE | Refills: 0 | Status: SHIPPED | OUTPATIENT
Start: 2019-11-25 | End: 2019-12-06

## 2019-11-25 ASSESSMENT — ENCOUNTER SYMPTOMS
VOMITING: 0
DIARRHEA: 0
ABDOMINAL PAIN: 1
HEMATOCHEZIA: 0
UNEXPECTED WEIGHT CHANGE: 0
HEARTBURN: 1
PALPITATIONS: 0
FEVER: 0
SHORTNESS OF BREATH: 0

## 2019-11-25 ASSESSMENT — MIFFLIN-ST. JEOR: SCORE: 1746.18

## 2019-11-25 NOTE — PATIENT INSTRUCTIONS
Patient Education     GERD (Adult)    The esophagus is a tube that carries food from the mouth to the stomach. A valve (the LES, lower esophageal sphincter) at the lower end of the esophagus prevents stomach acid from flowing upward. When this valve doesn't work properly, stomach contents may repeatedly flow back up (reflux) into the esophagus. This is called gastroesophageal reflux disease (GERD). GERD can irritate the esophagus. It can cause problems with pain, swallowing or breathing. In severe cases, GERD can cause recurrent pneumonia (from aspiration or breathing in particles) or other serious problems.  Symptoms of reflux include burning, pressure or sharp pain in the upper abdomen or mid to lower chest. The pain can spread to the neck, back, or shoulder. There may be belching, an acid taste in the back of the throat, chronic cough, or sore throat, or hoarseness. GERD symptoms often occur during the day after a big meal. They can also occur at night when lying down.   Home care  Lifestyle changes can help reduce symptoms. If needed, your healthcare provider may prescribe medicines. Symptoms often improve with treatment, but if treatment is stopped, the symptoms often return after a few months. So most persons with GERD will need to continue treatment or get treatment on and off.  Lifestyle changes    Limit or avoid fatty, fried, and spicy foods, as well as coffee, chocolate, mint, and foods with high acid content such as tomatoes and citrus fruit and juices (orange, grapefruit, lemon).    Don t eat large meals, especially at night. Frequent, smaller meals are best. Don't lie down right after eating. And don t eat anything 3 hours before going to bed.    Don't drink alcohol or smoke. As much as possible, stay away from second hand smoke.    If you are overweight, losing weight will reduce symptoms.     Don't wear tight clothing around your stomach area.    If your symptoms occur during sleep, use a foam wedge  "to elevate your upper body (not just your head.) Or, place 4\" blocks under the head of your bed. Or use 2 bed risers under your bedframe.  Medicines  If needed, medicines can help relieve the symptoms of GERD and prevent damage to the esophagus. Discuss a medicine plan with your healthcare provider. This may include one or more of the following medicines:    Antacids to help neutralize the normal acids in your stomach.    Acid blockers (Histamine or H2 blockers) to decrease acid production.    Acid inhibitors (proton pump inhibitors PPIs) to decrease acid production in a different way than the blockers. They may work better, but can take a little longer to take effect.  Take an antacid 30 to 60 minutes after eating and at bedtime, but not at the same time as an acid blocker.Try not to take medicines such as ibuprofen and aspirin. If you are taking aspirin for your heart or other medical reasons, talk to your healthcare provider about stopping it.  Follow-up care  Follow up with your healthcare provider or as advised by our staff.  When to seek medical advice  Call your healthcare provider if any of the following occur:    Stomach pain gets worse or moves to the lower right abdomen (appendix area)    Chest pain appears or gets worse, or spreads to the back, neck, shoulder, or arm    An over-the-counter trial of medicine doesn't relieve your symptoms    Weight loss that can't be explained    Trouble or pain swallowing    Frequent vomiting (can t keep down liquids)    Blood in the stool or vomit (red or black in color)    Feeling weak or dizzy    Fever of 100.4 F (38 C) or higher, or as directed by your healthcare provider  Date Last Reviewed: 3/1/2018    8927-9036 [employer]. [address] [place] [address], [place] [address]. All rights reserved. This information is not intended as a substitute for professional medical care. Always follow your healthcare professional's instructions.           "

## 2019-11-25 NOTE — PROGRESS NOTES
"Subjective     Roodradhika Borden is a 34 year old male who presents to clinic today for the following health issues:    HPI     F/u on chest pain still having issues on and off-may be eating related- spicy food/coffee will give him chest pain  Patient is a 34-year-old male who presents to the clinic for concerns of chest and abdominal pain.  Cramping, achy pain that goes from the abdomen to up his chest and neck.  Has associated sour taste in the mouth which occasionally wakes him up at night.  Has been going on and off for several weeks.  Triggered usually by spicy food and coffee which he has avoided, however still gets intermittent symptoms.  Tums occasionally helpful.  No personal or family history of cardiac disease.  Denies any palpitations, difficulty breathing, vision changes.    Reviewed and updated as needed this visit by Provider         Review of Systems   Constitutional: Negative for fever and unexpected weight change.   Respiratory: Negative for shortness of breath.    Cardiovascular: Negative for palpitations.   Gastrointestinal: Positive for abdominal pain and heartburn. Negative for diarrhea, hematochezia and vomiting.            Objective    /70 (BP Location: Right arm, Patient Position: Chair, Cuff Size: Adult Regular)   Pulse 73   Temp 99.3  F (37.4  C) (Oral)   Resp 16   Ht 1.765 m (5' 9.5\")   Wt 80.3 kg (177 lb)   SpO2 98%   BMI 25.76 kg/m    Body mass index is 25.76 kg/m .  Physical Exam  Constitutional:       Appearance: He is not ill-appearing.   HENT:      Mouth/Throat:      Mouth: Mucous membranes are moist.      Pharynx: Oropharynx is clear.      Comments: No significant dental caries  Cardiovascular:      Rate and Rhythm: Normal rate and regular rhythm.   Pulmonary:      Effort: Pulmonary effort is normal.      Breath sounds: Normal breath sounds.   Chest:      Chest wall: No tenderness.   Abdominal:      General: Abdomen is flat.      Palpations: Abdomen is soft.      " Tenderness: There is no abdominal tenderness.            Assessment & Plan     1. Dyspepsia  New problem.  Start Prilosec.  Avoid triggering foods.  Recheck in 1 week.  Symptoms not improved, may benefit from H. pylori testing or a GI consult for possible EGD.  - omeprazole (PRILOSEC) 40 MG DR capsule; Take 1 capsule (40 mg) by mouth daily  Dispense: 30 capsule; Refill: 0       Return in about 1 week (around 12/2/2019) for If symptoms do not improve or gets worse.. In addition, we will follow-up in 1 week for concerns of intermittent left testicular pain that has been going on for months.    Yo Morales MD  [facility]    Documentation was prepared using Dragon voice recognition software. Please excuse typographical errors. Please contact me if documentation is unclear.

## 2019-12-06 ENCOUNTER — OFFICE VISIT (OUTPATIENT)
Dept: FAMILY MEDICINE | Facility: CLINIC | Age: 34
End: 2019-12-06
Payer: COMMERCIAL

## 2019-12-06 VITALS
DIASTOLIC BLOOD PRESSURE: 60 MMHG | BODY MASS INDEX: 25.56 KG/M2 | HEIGHT: 70 IN | RESPIRATION RATE: 16 BRPM | SYSTOLIC BLOOD PRESSURE: 120 MMHG | TEMPERATURE: 98.8 F | WEIGHT: 178.5 LBS | HEART RATE: 74 BPM | OXYGEN SATURATION: 96 %

## 2019-12-06 DIAGNOSIS — R10.13 DYSPEPSIA: Primary | ICD-10-CM

## 2019-12-06 DIAGNOSIS — N50.819 TESTICULAR DISCOMFORT: ICD-10-CM

## 2019-12-06 PROCEDURE — 99214 OFFICE O/P EST MOD 30 MIN: CPT | Performed by: FAMILY MEDICINE

## 2019-12-06 RX ORDER — OMEPRAZOLE 40 MG/1
40 CAPSULE, DELAYED RELEASE ORAL DAILY
Qty: 60 CAPSULE | Refills: 0 | Status: SHIPPED | OUTPATIENT
Start: 2019-12-06

## 2019-12-06 ASSESSMENT — ENCOUNTER SYMPTOMS
FEVER: 0
DIFFICULTY URINATING: 0
DYSURIA: 0

## 2019-12-06 ASSESSMENT — MIFFLIN-ST. JEOR: SCORE: 1752.98

## 2019-12-06 NOTE — PROGRESS NOTES
"Subjective     Peter Borden is a 34 year old male who presents to clinic today for the following health issues:    HPI   Pleasant 34-year-old female presents to clinic for follow-up of dyspepsia.  Was previously evaluated by me, started on Prilosec and dietary modifications.  Smptoms are significantly improved.  Has been avoiding spicy foods and coffee which has also helped.  Occasionally does get symptoms of sour taste in his mouth when playing soccer.  Denies fever, unintended weight changes, nausea or vomiting    Additionally has concerns of off and on left testicular discomfort for the last few months.  Occurs occasionally random and sometimes before sex and improves with ejaculation.  Sometimes feels a soft swelling over his left testicle, testicle itself has no new masses.  Denies penile discharge or skin changes around his penis.  Heterosexual, no history of sexually transmitted infections, single sex partner.  No associated abdominal pain or nausea.  No previous injuries to the groin.    Reviewed and updated as needed this visit by Provider         Review of Systems   Constitutional: Negative for fever.   Genitourinary: Positive for scrotal swelling and testicular pain. Negative for difficulty urinating, discharge, dysuria and penile pain.      Medications updated and reviewed.  Past, family and surgical history is updated and reviewed in the record.  Past Medical History:   Diagnosis Date     Anxiety 5/22/2017     GERD (gastroesophageal reflux disease)             Objective    /60 (BP Location: Right arm, Patient Position: Chair, Cuff Size: Adult Large)   Pulse 74   Temp 98.8  F (37.1  C) (Oral)   Resp 16   Ht 1.765 m (5' 9.5\")   Wt 81 kg (178 lb 8 oz)   SpO2 96%   BMI 25.98 kg/m    Body mass index is 25.98 kg/m .  Physical Exam  Vitals signs reviewed.   Constitutional:       Appearance: Normal appearance.   Cardiovascular:      Rate and Rhythm: Normal rate and regular rhythm.   Pulmonary:    "   Effort: Pulmonary effort is normal.      Breath sounds: Normal breath sounds.   Genitourinary:     Penis: Normal.       Comments: Bilateral epididymis nontender, no swelling.  Normal cremasteric reflex.  No testicular masses.  No urethral discharge.    No inguinal masses.  Neurological:      Mental Status: He is alert.                Assessment & Plan     1. Dyspepsia  Controlled.  Continue Prilosec.  Informed that if symptoms worsen, he should make a follow-up appointment; at that time we will consider referral to gastroenterology and possible EGD for further evaluation.  Otherwise, management per PCP.  - omeprazole (PRILOSEC) 40 MG DR capsule; Take 1 capsule (40 mg) by mouth daily  Dispense: 60 capsule; Refill: 0    2. Testicular discomfort  New problem, intermittent symptoms.  Exam not suggestive of testicular torsion, epididymitis, varicoceles or orchitis.  Given that he notices it most during sex, and may be related to epididymal discomfort, will refer to urology for additional assessment, appreciate help.  Otherwise explained, he should go be evaluated emergency department if having acute testicular pain that is sudden onset, severe at that time as this may be testicular torsion.  - UROLOGY ADULT REFERRAL    Return in about 1 month (around 1/6/2020) for If symptoms do not improve or gets worse..    Yo Morales MD  [facility]    Documentation was prepared using Dragon voice recognition software. Please excuse typographical errors. Please contact me if documentation is unclear.

## 2020-01-02 DIAGNOSIS — N50.819 TESTICULAR PAIN: Primary | ICD-10-CM

## 2020-01-03 ENCOUNTER — OFFICE VISIT (OUTPATIENT)
Dept: UROLOGY | Facility: CLINIC | Age: 35
End: 2020-01-03
Attending: FAMILY MEDICINE
Payer: COMMERCIAL

## 2020-01-03 ENCOUNTER — HOSPITAL ENCOUNTER (OUTPATIENT)
Dept: ULTRASOUND IMAGING | Facility: CLINIC | Age: 35
Discharge: HOME | End: 2020-01-03
Attending: PHYSICIAN ASSISTANT | Admitting: PHYSICIAN ASSISTANT
Payer: COMMERCIAL

## 2020-01-03 VITALS
HEART RATE: 72 BPM | SYSTOLIC BLOOD PRESSURE: 120 MMHG | BODY MASS INDEX: 26.98 KG/M2 | HEIGHT: 68 IN | WEIGHT: 178 LBS | DIASTOLIC BLOOD PRESSURE: 80 MMHG

## 2020-01-03 DIAGNOSIS — N50.819 TESTICULAR PAIN: ICD-10-CM

## 2020-01-03 LAB
ALBUMIN UR-MCNC: NEGATIVE MG/DL
APPEARANCE UR: CLEAR
BILIRUB UR QL STRIP: NEGATIVE
COLOR UR AUTO: YELLOW
GLUCOSE UR STRIP-MCNC: NEGATIVE MG/DL
HGB UR QL STRIP: NEGATIVE
KETONES UR STRIP-MCNC: NEGATIVE MG/DL
LEUKOCYTE ESTERASE UR QL STRIP: NEGATIVE
NITRATE UR QL: NEGATIVE
PH UR STRIP: 5.5 PH (ref 5–7)
SOURCE: NORMAL
SP GR UR STRIP: >1.03 (ref 1–1.03)
UROBILINOGEN UR STRIP-ACNC: 0.2 EU/DL (ref 0.2–1)

## 2020-01-03 PROCEDURE — 99203 OFFICE O/P NEW LOW 30 MIN: CPT | Performed by: PHYSICIAN ASSISTANT

## 2020-01-03 PROCEDURE — 81003 URINALYSIS AUTO W/O SCOPE: CPT | Mod: QW | Performed by: PHYSICIAN ASSISTANT

## 2020-01-03 PROCEDURE — 76870 US EXAM SCROTUM: CPT

## 2020-01-03 ASSESSMENT — PAIN SCALES - GENERAL: PAINLEVEL: SEVERE PAIN (6)

## 2020-01-03 ASSESSMENT — MIFFLIN-ST. JEOR: SCORE: 1718.96

## 2020-01-03 NOTE — LETTER
1/3/2020       RE: Peter Borden  [address] [place] [address]     Dear Colleague,    Thank you for referring your patient, Peter Borden, to the [facility] [place] at [facility]. Please see a copy of my visit note below.        Again, thank you for allowing me to participate in the care of your patient.      Sincerely,    Emily Meza PA-C, [facility]IZABETH

## 2020-01-03 NOTE — PROGRESS NOTES
CC: intermittent testicular discomfort    HPI:  Peter Borden is a 34 year old male who presents in consultation from Dr. Morales for evaluation of the above. Notes off and on left testicular discomfort for the last few months.  Occurs occasionally random and sometimes before sex and improves with ejaculation.  Sometimes feels a soft swelling over his left testicle, testicle itself has no new masses.  Denies penile discharge or skin changes around his penis. Recent STD testing neg.    Ongoing for several years.  Has never had imaging.     No voiding concerns, no gross hematuria. No hematospermia.     Past Medical History:   Diagnosis Date     Anxiety 5/22/2017     GERD (gastroesophageal reflux disease)        Past Surgical History:   Procedure Laterality Date     NO HISTORY OF SURGERY         Social History     Socioeconomic History     Marital status: Single     Spouse name: Not on file     Number of children: Not on file     Years of education: Not on file     Highest education level: Not on file   Occupational History     Not on file   Social Needs     Financial resource strain: Not on file     Food insecurity:     Worry: Not on file     Inability: Not on file     Transportation needs:     Medical: Not on file     Non-medical: Not on file   Tobacco Use     Smoking status: Never Smoker     Smokeless tobacco: Never Used   Substance and Sexual Activity     Alcohol use: Yes     Comment: social, minimal      Drug use: No     Sexual activity: Yes     Partners: Female     Birth control/protection: None   Lifestyle     Physical activity:     Days per week: Not on file     Minutes per session: Not on file     Stress: Not on file   Relationships     Social connections:     Talks on phone: Not on file     Gets together: Not on file     Attends Holiness service: Not on file     Active member of club or organization: Not on file     Attends meetings of clubs or organizations: Not on file     Relationship status: Not on file      Intimate partner violence:     Fear of current or ex partner: Not on file     Emotionally abused: Not on file     Physically abused: Not on file     Forced sexual activity: Not on file   Other Topics Concern     Parent/sibling w/ CABG, MI or angioplasty before 65F 55M? Not Asked   Social History Narrative    From [place]. 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    No Known Allergies    Current Outpatient Medications   Medication     omeprazole (PRILOSEC) 40 MG DR capsule     No current facility-administered medications for this visit.          PEx:     PSYCH: NAD  EYES: EOMI  MOUTH: MMM  NECK: Supple, no notable adenopathy  RESP: Unlabored breathing  CARDIAC: No LE edema  SKIN: Warm, no rashes  ABD: soft, Nontender  NEURO: AAO x3    Urine: neg    A/P: Peter Borden is a 34 year old male with scrotal discomfort  -Warm tub soaks  -Scrotal support  -US to r/o pathology. May need to consider seeing Dr. Floyd if US is normal.     Emily Meza PA-C  [facility]    10 minutes were spent with the patient today, > 50% in counseling and coordination of care

## 2020-01-14 ENCOUNTER — TELEPHONE (OUTPATIENT)
Dept: UROLOGY | Facility: CLINIC | Age: 35
End: 2020-01-14

## 2020-01-14 DIAGNOSIS — N50.819 TESTICULAR PAIN: Primary | ICD-10-CM

## 2020-03-10 ENCOUNTER — HEALTH MAINTENANCE LETTER (OUTPATIENT)
Age: 35
End: 2020-03-10

## 2020-12-27 ENCOUNTER — HEALTH MAINTENANCE LETTER (OUTPATIENT)
Age: 35
End: 2020-12-27

## 2021-04-24 ENCOUNTER — HEALTH MAINTENANCE LETTER (OUTPATIENT)
Age: 36
End: 2021-04-24

## 2021-08-31 NOTE — TELEPHONE ENCOUNTER
DIAGNOSIS:  Right heel pain 2-3 mo/ self/ [facility]    APPOINTMENT DATE: 9.3.21   NOTES STATUS DETAILS   OFFICE NOTE from referring provider N/A    OFFICE NOTE from other specialist N/A    DISCHARGE SUMMARY from hospital N/A    DISCHARGE REPORT from the ER N/A    OPERATIVE REPORT N/A    EMG report N/A    MEDICATION LIST Internal    MRI N/A    DEXA (osteoporosis/bone health) N/A    CT SCAN N/A    XRAYS (IMAGES & REPORTS) N/A

## 2021-09-01 ENCOUNTER — OFFICE VISIT (OUTPATIENT)
Dept: DERMATOLOGY | Facility: CLINIC | Age: 36
End: 2021-09-01
Payer: COMMERCIAL

## 2021-09-01 DIAGNOSIS — L73.9 FOLLICULITIS: Primary | ICD-10-CM

## 2021-09-01 PROCEDURE — 99203 OFFICE O/P NEW LOW 30 MIN: CPT | Performed by: DERMATOLOGY

## 2021-09-01 ASSESSMENT — PAIN SCALES - GENERAL: PAINLEVEL: NO PAIN (0)

## 2021-09-01 NOTE — PATIENT INSTRUCTIONS
- For folliculitis, start applying clindamycin lotion. You will start noticing improvement in 1 month.  - Consider using ketoconazole 2% shampoo again for general scalp health.  - Consider using anti-dandruff shampoo for beneficial anti-inflammatory effects.  - Follow up in 2-3 months in clinic.  - Future: consider acne wash (benzoyl peroxide), but this can bleach clothing and towels.

## 2021-09-01 NOTE — PROGRESS NOTES
[facility] Dermatology Note  Encounter Date: Sep 1, 2021  Office Visit     Dermatology Problem List:  #. Scalp folliculitis  - Rx: topical clindamycin    ____________________________________________    Assessment & Plan:    #. Scalp folliculitis  - Ddx includes folliculitis, less likely early pseudofolliculitis barbae, acne keloidalis.  - Most likely folliculitis given the diffuse distribution. No associated rash or involvement of neck, face, extensor surfaces.  - Start clindamycin lotion BID prn  - Advised could trial BP wash if desired as well but can bleach towels/clothing  - Continue ketoconazole 2% shampoo or anti-dandruff shampoo.  - Follow up in 3 months.    Procedures Performed:   None    Follow-up: 3 month(s) in-person, or earlier for new or changing lesions    Staff and Student:    Tammi Cruz, MS3  Nereida Hernandez MD    Staff Physician:  I was present with the medical student who participated in the service and in the documentation of the note. I have verified the history and personally performed the physical exam and medical decision making. I agree with the assessment and plan of care as documented in the note.       Nereida Hernandez MD    Department of Dermatology  [facility]: Phone: [phone], Fax: [phone]  9/2/2021    ____________________________________________    CC: Derm Problem (Ruud states that he is having issues with bumps on his scalp and they will get very itchy. - has seen a doctor before and the issue is not resolving)      HPI:  Mr. Peter Borden is a(n) 35 year old male who presents today as a new patient for scalp lesions. Patient reports 1 year of itchy bumps on his scalp. He has noticed that hair sometimes doesn't grow back in the affected regions. Occasional drainage noted. He was placed on ketoconazole 2% shampoo by PCP. He stopped when he saw no improvement  after a month. He does not recall anything out of ordinary that triggered the initial onset. No associated pain, bleeding, or rash. No symptoms on extensor surfaces. No similar symptoms in household members. He has children at home, but denies concerns for lice exposure.    Patient is otherwise feeling well, without additional skin concerns.    Labs Reviewed:  N/A    Physical Exam:  Vitals: There were no vitals taken for this visit.  SKIN: Focused examination of scalp was performed.  - Multiple papules with hypopigmented centers on the scalp.  - Multiple healed hyperpigmented scars noted.  - No flaking or rash.  - No significant hair loss in examined areas.  - No other lesions of concern on areas examined.   - No other lesions on face, neck, or extensor surfaces.    Medications:  Current Outpatient Medications   Medication     omeprazole (PRILOSEC) 40 MG DR capsule     No current facility-administered medications for this visit.        Past Medical History:   Patient Active Problem List   Diagnosis   (none) - all problems resolved or deleted     Past Medical History:   Diagnosis Date     Anxiety 5/22/2017     GERD (gastroesophageal reflux disease)         CC Referred Self, MD  No address on file on close of this encounter.

## 2021-09-01 NOTE — NURSING NOTE
Dermatology Rooming Note    Peter Borden's goals for this visit include:   Chief Complaint   Patient presents with     Derm Problem     Ruud states that he is having issues with bumps on his scalp and they will get very itchy. - has seen a doctor before and the issue is not resolving   Gwendolyn Charles LPN

## 2021-09-01 NOTE — LETTER
9/1/2021       RE: Peter Borden  [address] [place] [address]     Dear Colleague,    Thank you for referring your patient, Peter Borden, to the [facility] DERMATOLOGY [facility]INIC [place] at [facility]. Please see a copy of my visit note below.    [facility] Dermatology Note  Encounter Date: Sep 1, 2021  Office Visit     Dermatology Problem List:  #. Scalp folliculitis  - Rx: topical clindamycin    ____________________________________________    Assessment & Plan:    #. Scalp folliculitis  - Ddx includes folliculitis, less likely early pseudofolliculitis barbae, acne keloidalis.  - Most likely folliculitis given the diffuse distribution. No associated rash or involvement of neck, face, extensor surfaces.  - Start clindamycin lotion BID prn  - Advised could trial BP wash if desired as well but can bleach towels/clothing  - Continue ketoconazole 2% shampoo or anti-dandruff shampoo.  - Follow up in 3 months.    Procedures Performed:   None    Follow-up: 3 month(s) in-person, or earlier for new or changing lesions    Staff and Student:    Tammi Cruz, MS3  Nereida Hernandez MD    Staff Physician:  I was present with the medical student who participated in the service and in the documentation of the note. I have verified the history and personally performed the physical exam and medical decision making. I agree with the assessment and plan of care as documented in the note.       Nereida Hernandez MD    Department of Dermatology  [facility]: Phone: [phone], Fax: [phone]  9/2/2021    ____________________________________________    CC: Derm Problem (Ruud states that he is having issues with bumps on his scalp and they will get very itchy. - has seen a doctor before and the issue is not resolving)      HPI:  Mr. Peter Borden is a(n)  35 year old male who presents today as a new patient for scalp lesions. Patient reports 1 year of itchy bumps on his scalp. He has noticed that hair sometimes doesn't grow back in the affected regions. Occasional drainage noted. He was placed on ketoconazole 2% shampoo by PCP. He stopped when he saw no improvement after a month. He does not recall anything out of ordinary that triggered the initial onset. No associated pain, bleeding, or rash. No symptoms on extensor surfaces. No similar symptoms in household members. He has children at home, but denies concerns for lice exposure.    Patient is otherwise feeling well, without additional skin concerns.    Labs Reviewed:  N/A    Physical Exam:  Vitals: There were no vitals taken for this visit.  SKIN: Focused examination of scalp was performed.  - Multiple papules with hypopigmented centers on the scalp.  - Multiple healed hyperpigmented scars noted.  - No flaking or rash.  - No significant hair loss in examined areas.  - No other lesions of concern on areas examined.   - No other lesions on face, neck, or extensor surfaces.    Medications:  Current Outpatient Medications   Medication     omeprazole (PRILOSEC) 40 MG DR capsule     No current facility-administered medications for this visit.        Past Medical History:   Patient Active Problem List   Diagnosis   (none) - all problems resolved or deleted     Past Medical History:   Diagnosis Date     Anxiety 5/22/2017     GERD (gastroesophageal reflux disease)         CC Referred Self, MD  No address on file on close of this encounter.

## 2021-09-03 ENCOUNTER — PRE VISIT (OUTPATIENT)
Dept: ORTHOPEDICS | Facility: CLINIC | Age: 36
End: 2021-09-03

## 2021-09-03 ENCOUNTER — OFFICE VISIT (OUTPATIENT)
Dept: ORTHOPEDICS | Facility: CLINIC | Age: 36
End: 2021-09-03
Payer: COMMERCIAL

## 2021-09-03 DIAGNOSIS — M79.671 PAIN OF RIGHT HEEL: Primary | ICD-10-CM

## 2021-09-03 PROCEDURE — 99203 OFFICE O/P NEW LOW 30 MIN: CPT | Performed by: FAMILY MEDICINE

## 2021-09-03 NOTE — PROGRESS NOTES
[facility] CLINICS AND SURGERY CENTER  SPORTS & ORTHOPEDIC CLINIC VISIT     Sep 3, 2021        ASSESSMENT & PLAN    34 yo with insertional achilles tendinopathy of right heel    Reviewed imaging and assessment with patient in detail  Given home exercises and referred to PT  Ok to continue activity as tolerated.   May consider trial of heel cup or lift.   Follow up if not improving in 6 weeks    Sarabjit Rodriguez MD  [facility] SPORTS MEDICINE CLINIC [place]    -----  Chief Complaint   Patient presents with     Right Foot - Pain       SUBJECTIVE  Peter Borden is a/an 35 year old male who is seen as a self referral for evaluation of  Right heel pain.     The patient is seen by themselves.  The patient is Right handed    Onset: 3 month(s) ago. Patient describes injury as having posterior heel pain. He use to play soccer but had to stop due to pain. He just tried to play again and now he is in pain again.   Location of Pain: right posterior heel  Worsened by: Running, Soccer  Better with: NA   Treatments tried: ice, heat, Tylenol and ibuprofen  Associated symptoms: no distal numbness or tingling; denies swelling or warmth    Orthopedic/Surgical history: NO  Social History/Occupation:  at [employer]       REVIEW OF SYSTEMS:    Do you have fever, chills, weight loss? No    Do you have any vision problems? No    Do you have any chest pain or edema? No    Do you have any shortness of breath or wheezing?  No    Do you have stomach problems? No    Do you have any numbness or focal weakness? No     Do you have diabetes? No    Do you have problems with bleeding or clotting? No    Do you have an rashes or other skin lesions? No    OBJECTIVE:  There were no vitals taken for this visit.     General: Alert, pleasant, no distress  Right foot pain: warm, well perfused, SILT throughout     Inspection: no swelling, ecchymosis, deformity     ROM:symmetric without pain     Strength:some  Pain with heel walking. No  weakness     Palpation:ttpover achilles insertion. No ttp over midportion or proximal achilles. No ttp with calcaneus compression     Special Tests: none      RADIOLOGY:    No imaging this visit

## 2021-09-03 NOTE — LETTER
9/3/2021      RE: [address] [place] [address]         [facility] CLINICS AND SURGERY CENTER  SPORTS & ORTHOPEDIC CLINIC VISIT     Sep 3, 2021        ASSESSMENT & PLAN    34 yo with insertional achilles tendinopathy of right heel    Reviewed imaging and assessment with patient in detail  Given home exercises and referred to PT  Ok to continue activity as tolerated.   May consider trial of heel cup or lift.   Follow up if not improving in 6 weeks    Sarabjit Rodriguez MD  [facility] SPORTS MEDICINE CLINIC [place]    -----  Chief Complaint   Patient presents with     Right Foot - Pain       SUBJECTIVE  Peter Borden is a/an 35 year old male who is seen as a self referral for evaluation of  Right heel pain.     The patient is seen by themselves.  The patient is Right handed    Onset: 3 month(s) ago. Patient describes injury as having posterior heel pain. He use to play soccer but had to stop due to pain. He just tried to play again and now he is in pain again.   Location of Pain: right posterior heel  Worsened by: Running, Soccer  Better with: NA   Treatments tried: ice, heat, Tylenol and ibuprofen  Associated symptoms: no distal numbness or tingling; denies swelling or warmth    Orthopedic/Surgical history: NO  Social History/Occupation:  at [employer]       REVIEW OF SYSTEMS:    Do you have fever, chills, weight loss? No    Do you have any vision problems? No    Do you have any chest pain or edema? No    Do you have any shortness of breath or wheezing?  No    Do you have stomach problems? No    Do you have any numbness or focal weakness? No     Do you have diabetes? No    Do you have problems with bleeding or clotting? No    Do you have an rashes or other skin lesions? No    OBJECTIVE:  There were no vitals taken for this visit.     General: Alert, pleasant, no distress  Right foot pain: warm, well perfused, SILT throughout     Inspection: no swelling, ecchymosis,  deformity     ROM:symmetric without pain     Strength:some  Pain with heel walking. No weakness     Palpation:ttpover achilles insertion. No ttp over midportion or proximal achilles. No ttp with calcaneus compression     Special Tests: none      RADIOLOGY:    No imaging this visit       Sarabjit Rodriguez MD

## 2021-10-09 ENCOUNTER — HEALTH MAINTENANCE LETTER (OUTPATIENT)
Age: 36
End: 2021-10-09

## 2022-01-19 ENCOUNTER — LAB (OUTPATIENT)
Dept: LAB | Facility: CLINIC | Age: 37
End: 2022-01-19
Payer: COMMERCIAL

## 2022-01-19 ENCOUNTER — OFFICE VISIT (OUTPATIENT)
Dept: FAMILY MEDICINE | Facility: CLINIC | Age: 37
End: 2022-01-19
Payer: COMMERCIAL

## 2022-01-19 VITALS
SYSTOLIC BLOOD PRESSURE: 134 MMHG | HEIGHT: 70 IN | DIASTOLIC BLOOD PRESSURE: 76 MMHG | HEART RATE: 80 BPM | TEMPERATURE: 98 F | OXYGEN SATURATION: 97 % | WEIGHT: 188.2 LBS | BODY MASS INDEX: 26.94 KG/M2

## 2022-01-19 DIAGNOSIS — Z23 ENCOUNTER FOR IMMUNIZATION: ICD-10-CM

## 2022-01-19 DIAGNOSIS — Z23 ENCOUNTER FOR IMMUNIZATION: Primary | ICD-10-CM

## 2022-01-19 PROCEDURE — 99000 SPECIMEN HANDLING OFFICE-LAB: CPT | Performed by: PATHOLOGY

## 2022-01-19 PROCEDURE — 86765 RUBEOLA ANTIBODY: CPT | Performed by: NURSE PRACTITIONER

## 2022-01-19 PROCEDURE — 86762 RUBELLA ANTIBODY: CPT | Performed by: NURSE PRACTITIONER

## 2022-01-19 PROCEDURE — 90686 IIV4 VACC NO PRSV 0.5 ML IM: CPT | Performed by: NURSE PRACTITIONER

## 2022-01-19 PROCEDURE — 86735 MUMPS ANTIBODY: CPT | Performed by: NURSE PRACTITIONER

## 2022-01-19 PROCEDURE — 99203 OFFICE O/P NEW LOW 30 MIN: CPT | Mod: 25 | Performed by: NURSE PRACTITIONER

## 2022-01-19 PROCEDURE — 86735 MUMPS ANTIBODY: CPT | Mod: 90 | Performed by: PATHOLOGY

## 2022-01-19 PROCEDURE — 86787 VARICELLA-ZOSTER ANTIBODY: CPT | Performed by: NURSE PRACTITIONER

## 2022-01-19 PROCEDURE — 36415 COLL VENOUS BLD VENIPUNCTURE: CPT | Performed by: PATHOLOGY

## 2022-01-19 PROCEDURE — G0008 ADMIN INFLUENZA VIRUS VAC: HCPCS | Performed by: NURSE PRACTITIONER

## 2022-01-19 ASSESSMENT — ANXIETY QUESTIONNAIRES
7. FEELING AFRAID AS IF SOMETHING AWFUL MIGHT HAPPEN: NOT AT ALL
GAD7 TOTAL SCORE: 0
1. FEELING NERVOUS, ANXIOUS, OR ON EDGE: NOT AT ALL
6. BECOMING EASILY ANNOYED OR IRRITABLE: NOT AT ALL
2. NOT BEING ABLE TO STOP OR CONTROL WORRYING: NOT AT ALL
3. WORRYING TOO MUCH ABOUT DIFFERENT THINGS: NOT AT ALL
5. BEING SO RESTLESS THAT IT IS HARD TO SIT STILL: NOT AT ALL

## 2022-01-19 ASSESSMENT — MIFFLIN-ST. JEOR: SCORE: 1786.98

## 2022-01-19 ASSESSMENT — PATIENT HEALTH QUESTIONNAIRE - PHQ9
SUM OF ALL RESPONSES TO PHQ QUESTIONS 1-9: 0
5. POOR APPETITE OR OVEREATING: NOT AT ALL

## 2022-01-19 NOTE — NURSING NOTE
Chief Complaint   Patient presents with     Imm/Inj     MMR, Varicella, and Flu-shot     Zen Meléndez CMA at 1:54 PM on 1/19/2022.

## 2022-01-19 NOTE — PATIENT INSTRUCTIONS
Patient Education     Measles, Mumps, Rubella Antibody  Does this test have other names?  Rubella antibody, Indonesian measles antibody, hemagglutination inhibition (ROBERTA), rubeola antibody, antibody titer   What is this test?  This test looks for antibodies to 3 diseases: measles, mumps, and rubella.   The test can find out whether you are immune to the 3 diseases. All 3 are quite contagious. If you've had them or been vaccinated against them, your immune system made antibodies to fight the viruses that cause them.   If you are planning to become pregnant, it's important to know whether you have these antibodies. If a woman develops rubella during the first 3 months of her pregnancy, it could cause birth defects.   Measles is also called rubeola, and rubella is also called Indonesian measles or 3-day measles.   Why do I need this test?  You may need this test if you are planning to become pregnant or are pregnant.  You may also have this test if you are a healthcare worker, because you may come in contact with children and adults who have measles, mumps, or rubella. If you don't have immunity, you can get vaccinated.   You may need this test if you are a college student to prove to your college or university that you are immune to measles, mumps, and rubella.   You also might have this test to diagnose measles, mumps, or rubella. Symptoms of measles include congestion, cough, fever, and a rash all over your body. Some people don't have the classic symptoms, but have measles antibodies in their blood. Symptoms of mumps include swollen parotid or salivary glands, fever, and headache. Symptoms of rubella include fever and a rash.   What other tests might I have along with this test?  Your healthcare provider may also order a swab test of the throat or a spinal fluid test to diagnose mumps.   What do my test results mean?  Test results may vary depending on your age, gender, health history, the method used for the test, and  other things. Your test results may not mean you have a problem. Ask your healthcare provider what your test results mean for you.    Your body makes two rubella antibodies: IgM and IgG. If IgM is found in your blood, but not IgG, you may have had a recent infection. If IgG is present, but not IgM, it could mean that you had an infection in the past or that you had the vaccine. These antibodies mean that you have the protection you need.    Findings for measles antibody:    If IgM antibodies are present, it may mean you have an active measles infection.      If you have IgG antibodies in your blood, it could mean that you are immune to measles or had the infection previously.    Findings for mumps:    If IgM antibodies are present, it may mean that you have an active or recent mumps infection    If IgG antibodies are present, it may mean that you have immunity to mumps from a previous exposure to the disease or immunization.  How is this test done?  The test is done with a blood sample. A needle is used to draw blood from a vein in your arm or hand. For a , the sample may be taken from the heel or umbilical cord.   Does this test pose any risks?  Having a blood test with a needle carries some risks. These include bleeding, infection, bruising, and feeling lightheaded. When the needle pricks your arm or hand, you may feel a slight sting or pain. Afterward, the site may be sore.    What might affect my test results?  Your results may be affected by how soon you are tested after being infected or vaccinated.  How do I get ready for this test?  You don't need to prepare for this test. Be sure your healthcare provider knows about all medicines, herbs, vitamins, and supplements you are taking. This includes medicines that don't need a prescription and any illicit drugs you may use.   [employer] last reviewed this educational content on 2020-2021 [employer]. All rights reserved. This  information is not intended as a substitute for professional medical care. Always follow your healthcare professional's instructions.

## 2022-01-19 NOTE — PROGRESS NOTES
"Peter Borden is a 36 year old male who presents today for MMR, flu, and varicella vaccine. 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 He remembers getting \"all of the vaccines\" as a kid but doesn't have documentation.    Patient also reports that he tested positive for COVID-19 on Monday, January 17, 2022. His only symptom is anosmia. Denies fevers, cough, CP, SOB, GI symptoms at time of exam.      Review of Systems   Constitutional, HEENT, cardiovascular, pulmonary, gi and gu systems are negative, except as otherwise noted.    Past Medical History:   Diagnosis Date     Anxiety 5/22/2017     GERD (gastroesophageal reflux disease)      Past Surgical History:   Procedure Laterality Date     NO HISTORY OF SURGERY       Social History     Socioeconomic History     Marital status:      Spouse name: Not on file     Number of children: 1     Years of education: Not on file     Highest education level: Not on file   Occupational History     Comment:  at [employer]   Tobacco Use     Smoking status: Never Smoker     Smokeless tobacco: Never Used   Vaping Use     Vaping Use: Never used   Substance and Sexual Activity     Alcohol use: Yes     Comment: social, minimal      Drug use: No     Sexual activity: Yes     Partners: Female     Birth control/protection: None   Other Topics Concern     Parent/sibling w/ CABG, MI or angioplasty before 65F 55M? Not Asked   Social History Narrative    From [place]. 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  Currently      Social Determinants of Health     Financial Resource Strain: Not on file   Food Insecurity: Not on file   Transportation Needs: Not on file   Physical Activity: Not on file   Stress: Not on file   Social Connections: Not on file   Intimate Partner Violence: Not on file   Housing Stability: Not on file     Family History   Problem Relation Age " "of Onset     No Known Problems Mother      No Known Problems Father        /76 (BP Location: Right arm, Patient Position: Sitting, Cuff Size: Adult Regular)   Pulse 80   Temp 98  F (36.7  C) (Oral)   Ht 1.765 m (5' 9.5\")   Wt 85.4 kg (188 lb 3.2 oz)   SpO2 97%   BMI 27.39 kg/m      Exam:  Constitutional: healthy, alert and no distress  Head: Normocephalic.   Cardiovascular: negative, PMI normal. No lifts, heaves, or thrills. RRR. No murmurs, clicks gallops or rub  Respiratory: Resps even, unlabored. Percussion normal. Good diaphragmatic excursion. Lungs clear to auscultation to posterior lobes.  Musculoskeletal: extremities normal- no gross deformities noted, gait normal and normal muscle tone   Neurologic: Gait normal. Reflexes normal and symmetric. Sensation grossly WNL.  Psychiatric: mentation appears normal and affect normal/bright      Assessment/Plan:  1. Encounter for immunization  - Since patient reports receivingpediatric immunizations, will check varicella and MMR titers. Flu vax administered as patient afebrile.  -Advised COVID vaccine 2 weeks after symptom resolution.  - INFLUENZA VACCINE IM >6 MO VALENT IIV4 (ALFURIA/FLUZONE)  - Rubella antibody IgM; Future  - Rubeola Antibody IgG; Future  - Mumps Immune Status, IgM; Future  - Varicella Zoster Virus Antibody IgG; Future      Options for treatment and follow-up care were reviewed with the patient. Patient engaged in the decision making process and verbalized understanding of the options discussed and agreed with the final plan.    TEA Forman, CNP  [employer] [facility] [employer]    "

## 2022-01-20 LAB
MEV IGG SER IA-ACNC: 32.1 AU/ML
MEV IGG SER IA-ACNC: POSITIVE
MUMPS ANTIBODY IGG INSTRUMENT VALUE: 71.4 AU/ML
MUV IGG SER QL IA: POSITIVE
RUBV IGG SERPL QL IA: 6.14 INDEX
RUBV IGG SERPL QL IA: POSITIVE
RUBV IGM SER IA-ACNC: <10 AU/ML
RUBV IGM SER QL: NEGATIVE
VZV IGG SER QL IA: 954.7 INDEX
VZV IGG SER QL IA: POSITIVE

## 2022-01-20 ASSESSMENT — ANXIETY QUESTIONNAIRES: GAD7 TOTAL SCORE: 0

## 2022-01-22 LAB — MUV IGM SER IA-ACNC: 0.23 IV

## 2022-02-03 ENCOUNTER — OFFICE VISIT (OUTPATIENT)
Dept: DERMATOLOGY | Facility: CLINIC | Age: 37
End: 2022-02-03
Payer: COMMERCIAL

## 2022-02-03 DIAGNOSIS — L73.9 FOLLICULITIS: Primary | ICD-10-CM

## 2022-02-03 DIAGNOSIS — L65.9 LOSS OF HAIR: ICD-10-CM

## 2022-02-03 DIAGNOSIS — L30.9 DERMATITIS: ICD-10-CM

## 2022-02-03 PROCEDURE — 99213 OFFICE O/P EST LOW 20 MIN: CPT | Mod: GC

## 2022-02-03 RX ORDER — KETOCONAZOLE 20 MG/ML
SHAMPOO TOPICAL
COMMUNITY
Start: 2021-10-15

## 2022-02-03 RX ORDER — CLINDAMYCIN PHOSPHATE 10 UG/ML
LOTION TOPICAL 2 TIMES DAILY
Qty: 60 ML | Refills: 5 | Status: SHIPPED | OUTPATIENT
Start: 2022-02-03

## 2022-02-03 RX ORDER — FLUOCINOLONE ACETONIDE 0.11 MG/ML
OIL TOPICAL 2 TIMES DAILY
Qty: 120 ML | Refills: 2 | Status: SHIPPED | OUTPATIENT
Start: 2022-02-03

## 2022-02-03 ASSESSMENT — PAIN SCALES - GENERAL: PAINLEVEL: NO PAIN (0)

## 2022-02-03 NOTE — PROGRESS NOTES
[facility] Dermatology Note  Encounter Date: Feb 3, 2022  Office Visit     Dermatology Problem List:  #. Scalp folliculitis with associated hair loss  - Rx: topical clindamycin, derma-smoothe oil  - consider bx if no improvement at future visit   ____________________________________________    Assessment & Plan:   #. Scalp folliculitis  - Ddx includes folliculitis, less likely early pseudofolliculitis barbae, acne keloidalis, folliculitis decalvans/dissecting cellulitis, syphillis. Noted to have patchy hair loss on exam today c/f possible early scarring process. Unfortunately, no papules or pustules to biopsy on exam today.   - Start clindamycin lotion BID prn  - Advised could trial BP wash if desired as well but can bleach towels/clothing  - Continue ketoconazole 2% shampoo or anti-dandruff shampoo.  - Initiate fluocinolone oil 2 times weekly given persistent scalp pruritus  - Follow up in 3 months, consider bx if no improvement     Procedures Performed:   None    Follow-up: 3 months, prn for new or changing lesions    Staff and Resident:     Kj Naranjo MD  PGY-2 Dermatology  Pager: [phone]    Patient was seen and examined with the dermatology resident. I agree with the history, review of systems, physical examination, assessments and plan.    Annalisa Redding MD  Professor and  Chair  Department of Dermatology  [facility]  ____________________________________________    CC: Derm Problem (pt states he is here for itching scalp and some hair loss off and on.)    HPI:  Mr. Peter Borden is a(n) 36 year old male who presents today as a return patient for scalp itching and hair loss    Patient states today that he has ongoing scalp symptoms mainly pruritus.  No scalp tenderness or pain.  He currently is using ketoconazole every other day and letting it stay in the shower for 2 to 3 minutes before rinsing it off.  Was unable to  the clindamycin lotion.  Has been going on his  "hair to demonstrate his patchy hair loss to us today.  No hair loss elsewhere.  States that he will get bumps on his scalp that are itchy and occasionally will drain, that he picks at.  No lesions unfortunately presents today.  He otherwise has been feeling well without concerns.    Prior HPI 9/1/2021  \"Mr. Peter Borden is a(n) 35 year old male who presents today as a new patient for scalp lesions. Patient reports 1 year of itchy bumps on his scalp. He has noticed that hair sometimes doesn't grow back in the affected regions. Occasional drainage noted. He was placed on ketoconazole 2% shampoo by PCP. He stopped when he saw no improvement after a month. He does not recall anything out of ordinary that triggered the initial onset. No associated pain, bleeding, or rash. No symptoms on extensor surfaces. No similar symptoms in household members. He has children at home, but denies concerns for lice exposure.\"    - Patient is otherwise feeling well, without additional skin concerns.    Labs Reviewed:  N/A    Physical Exam:  Vitals: There were no vitals taken for this visit.  SKIN: Focused examination of scalp was performed.  - Patchy decreased hair density primarily on vertex and occipital scalp, no tenderness  - No papules, pustules, or active drainage noted from scalp; not boggy   - No other lesions of concern on areas examined.                   Medications:  Current Outpatient Medications   Medication     clindamycin (CLEOCIN T) 1 % external lotion     fluocinolone acetonide (DERMA SMOOTHE/FS BODY) 0.01 % external oil     ketoconazole (NIZORAL) 2 % external shampoo     omeprazole (PRILOSEC) 40 MG DR capsule     No current facility-administered medications for this visit.      Past Medical History:   Patient Active Problem List   Diagnosis   (none) - all problems resolved or deleted     Past Medical History:   Diagnosis Date     Anxiety 5/22/2017     GERD (gastroesophageal reflux disease)        CC Saúl Reyna, " APRN CNP  [address] [place] [address] on close of this encounter.

## 2022-02-03 NOTE — LETTER
2/3/2022       RE: Peter Borden  [address] [place] [address]     Dear Colleague,    Thank you for referring your patient, Peter Borden, to the [facility]ERMATOLOG[facility] [place] at [facility]. Please see a copy of my visit note below.    [facility] Dermatology Note  Encounter Date: Feb 3, 2022  Office Visit     Dermatology Problem List:  #. Scalp folliculitis with associated hair loss  - Rx: topical clindamycin, derma-smoothe oil  - consider bx if no improvement at future visit   ____________________________________________    Assessment & Plan:   #. Scalp folliculitis  - Ddx includes folliculitis, less likely early pseudofolliculitis barbae, acne keloidalis, folliculitis decalvans/dissecting cellulitis, syphillis. Noted to have patchy hair loss on exam today c/f possible early scarring process. Unfortunately, no papules or pustules to biopsy on exam today.   - Start clindamycin lotion BID prn  - Advised could trial BP wash if desired as well but can bleach towels/clothing  - Continue ketoconazole 2% shampoo or anti-dandruff shampoo.  - Initiate fluocinolone oil 2 times weekly given persistent scalp pruritis   - Follow up in 3 months, consider bx if no improvement     Procedures Performed:   None    Follow-up: 3 months, prn for new or changing lesions    Staff and Resident:     Kj Naranjo MD  PGY-2 Dermatology  Pager: [phone]    ____________________________________________    CC: Derm Problem (pt states he is here for itching scalp and some hair loss off and on.)    HPI:  Mr. Peter Borden is a(n) 36 year old male who presents today as a return patient for scalp itching and hair loss    Patient states today that he has ongoing scalp symptoms mainly pruritus.  No scalp tenderness or pain.  He currently is using ketoconazole every other day and letting it stay in the shower for 2 to 3 minutes before rinsing it off.  Was  "unable to  the clindamycin lotion.  Has been going on his hair to demonstrate his patchy hair loss to us today.  No hair loss elsewhere.  States that he will get bumps on his scalp that are itchy and occasionally will drain, that he picks at.  No lesions unfortunately presents today.  He otherwise has been feeling well without concerns.    Prior HPI 9/1/2021  \"Mr. Peter Borden is a(n) 35 year old male who presents today as a new patient for scalp lesions. Patient reports 1 year of itchy bumps on his scalp. He has noticed that hair sometimes doesn't grow back in the affected regions. Occasional drainage noted. He was placed on ketoconazole 2% shampoo by PCP. He stopped when he saw no improvement after a month. He does not recall anything out of ordinary that triggered the initial onset. No associated pain, bleeding, or rash. No symptoms on extensor surfaces. No similar symptoms in household members. He has children at home, but denies concerns for lice exposure.\"    - Patient is otherwise feeling well, without additional skin concerns.    Labs Reviewed:  N/A    Physical Exam:  Vitals: There were no vitals taken for this visit.  SKIN: Focused examination of scalp was performed.  - Patchy decreased hair density primarily on vertex and occipital scalp, no tenderness  - No papules, pustules, or active drainage noted from scalp; not boggy   - No other lesions of concern on areas examined.                   Medications:  Current Outpatient Medications   Medication     clindamycin (CLEOCIN T) 1 % external lotion     fluocinolone acetonide (DERMA SMOOTHE/FS BODY) 0.01 % external oil     ketoconazole (NIZORAL) 2 % external shampoo     omeprazole (PRILOSEC) 40 MG DR capsule     No current facility-administered medications for this visit.      Past Medical History:   Patient Active Problem List   Diagnosis   (none) - all problems resolved or deleted     Past Medical History:   Diagnosis Date     Anxiety 5/22/2017     " GERD (gastroesophageal reflux disease)        CC Saúl Reyna, APRN CNP  [address] [place] [address] on close of this encounter.

## 2022-02-03 NOTE — PROGRESS NOTES
- using ketoconazole shampoo, using it every 2 days, lets it sit for 2-3 minutes before rinsing it off  - did not get the clindamycin lotion  - still getting new bumps  - today is an average today  - still has significant itch of the scalp  - no scalp pain or burning  - no scalp drainage or purulenec

## 2022-05-21 ENCOUNTER — HEALTH MAINTENANCE LETTER (OUTPATIENT)
Age: 37
End: 2022-05-21

## 2022-09-11 ENCOUNTER — HEALTH MAINTENANCE LETTER (OUTPATIENT)
Age: 37
End: 2022-09-11

## 2023-06-03 ENCOUNTER — HEALTH MAINTENANCE LETTER (OUTPATIENT)
Age: 38
End: 2023-06-03

## 2023-12-25 ENCOUNTER — OFFICE VISIT (OUTPATIENT)
Dept: FAMILY MEDICINE | Facility: CLINIC | Age: 38
End: 2023-12-25
Payer: COMMERCIAL

## 2023-12-25 VITALS
OXYGEN SATURATION: 98 % | HEART RATE: 87 BPM | WEIGHT: 198.2 LBS | RESPIRATION RATE: 12 BRPM | TEMPERATURE: 99.8 F | SYSTOLIC BLOOD PRESSURE: 103 MMHG | BODY MASS INDEX: 28.85 KG/M2 | DIASTOLIC BLOOD PRESSURE: 67 MMHG

## 2023-12-25 DIAGNOSIS — M54.50 ACUTE MIDLINE LOW BACK PAIN WITHOUT SCIATICA: Primary | ICD-10-CM

## 2023-12-25 DIAGNOSIS — J06.9 VIRAL URI WITH COUGH: ICD-10-CM

## 2023-12-25 LAB
ALBUMIN UR-MCNC: NEGATIVE MG/DL
APPEARANCE UR: CLEAR
BACTERIA #/AREA URNS HPF: ABNORMAL /HPF
BILIRUB UR QL STRIP: NEGATIVE
COLOR UR AUTO: YELLOW
GLUCOSE UR STRIP-MCNC: NEGATIVE MG/DL
HGB UR QL STRIP: NEGATIVE
KETONES UR STRIP-MCNC: NEGATIVE MG/DL
LEUKOCYTE ESTERASE UR QL STRIP: NEGATIVE
NITRATE UR QL: NEGATIVE
PH UR STRIP: 5.5 [PH] (ref 5–8)
RBC #/AREA URNS AUTO: ABNORMAL /HPF
SP GR UR STRIP: 1.02 (ref 1–1.03)
SQUAMOUS #/AREA URNS AUTO: ABNORMAL /LPF
UROBILINOGEN UR STRIP-ACNC: 0.2 E.U./DL
WBC #/AREA URNS AUTO: ABNORMAL /HPF

## 2023-12-25 PROCEDURE — 99203 OFFICE O/P NEW LOW 30 MIN: CPT | Performed by: STUDENT IN AN ORGANIZED HEALTH CARE EDUCATION/TRAINING PROGRAM

## 2023-12-25 PROCEDURE — 81001 URINALYSIS AUTO W/SCOPE: CPT | Performed by: STUDENT IN AN ORGANIZED HEALTH CARE EDUCATION/TRAINING PROGRAM

## 2023-12-25 NOTE — PROGRESS NOTES
Assessment & Plan     Acute midline low back pain without sciatica  Viral URI with cough  Discussed in detail differentials and further management. Symptoms are likely secondary to viral infection. UA was negative at this time making nephrolithiasis and UTI lower likelihood Recommended well hydration, over-the-counter analgesia, warm fluids and saline gargles. Will treat patient at this time symptomatically and supportively, this will include encouraging: using NSAIDs/Tylenol to help decrease pain and inflammation, resting, applying ice as needed. Further encouraged creams and rubs such as lidocaine or Voltaren. Follow up if symptoms persist or worsen. Written instructions/information provided. Patient understood and in agreement with the above plan. All questions are answered.   - UA with Microscopic reflex to Culture - Clinic Collect  - UA Microscopic with Reflex to Culture     17 minutes spent by me on the date of the encounter doing chart review, history and exam, documentation and further activities per the note. Billed based on complexity of care.     No follow-ups on file.    Cintia Metzger MD  [facility]    Rob Davalos is a 38 year old male who presents to clinic today for the following health issues:  Chief Complaint   Patient presents with    Fever     Back pain, chills since yesterday.     HPI    Had a cold and then chest pain with coughing. Took over the counter medication and felt better. Yesterday started having chills, fever and feeling weak. Dizziness and lightheadedness this morning. Tested for COVID at home and it was negative.     URI Adult    Onset of symptoms was 2 day(s) ago.  Course of illness is worsening.    Severity mild  Current and Associated symptoms: fever, chills, cough - non-productive, hoarse voice, headache, fatigue, and low back pain in the middle of the back. Constant back pain.   Treatment measures tried include Nyquil.  Predisposing factors  include ill contact: Work.    Review of Systems  Constitutional, HEENT, cardiovascular, pulmonary, gi and gu systems are negative, except as otherwise noted.      Objective    /67   Pulse 87   Temp 99.8  F (37.7  C) (Oral)   Resp 12   Wt 89.9 kg (198 lb 3.2 oz)   SpO2 98%   BMI 28.85 kg/m    Physical Exam   GENERAL: healthy, alert and no distress  EYES: Eyes grossly normal to inspection, PERRL and conjunctivae and sclerae normal  HENT: normal cephalic/atraumatic, ear canals and TM's normal, nose and mouth without ulcers or lesions, nasal mucosa edematous , rhinorrhea clear, yellow, white, and watery, oropharynx clear, oral mucous membranes moist, and sinuses: not tender  NECK: no adenopathy, no asymmetry, masses, or scars and thyroid normal to palpation  RESP: lungs clear to auscultation - no rales, rhonchi or wheezes  CV: regular rate and rhythm, normal S1 S2, no S3 or S4, no murmur, click or rub, no peripheral edema and peripheral pulses strong  SKIN: no suspicious lesions or rashes  BACK: no CVA tenderness, no paralumbar tenderness    Results for orders placed or performed in visit on 12/25/23 (from the past 24 hour(s))   UA with Microscopic reflex to Culture - Clinic Collect    Specimen: Urine, Clean Catch   Result Value Ref Range    Color Urine Yellow Colorless, Straw, Light Yellow, Yellow    Appearance Urine Clear Clear    Glucose Urine Negative Negative mg/dL    Bilirubin Urine Negative Negative    Ketones Urine Negative Negative mg/dL    Specific Gravity Urine 1.025 1.005 - 1.030    Blood Urine Negative Negative    pH Urine 5.5 5.0 - 8.0    Protein Albumin Urine Negative Negative mg/dL    Urobilinogen Urine 0.2 0.2, 1.0 E.U./dL    Nitrite Urine Negative Negative    Leukocyte Esterase Urine Negative Negative   UA Microscopic with Reflex to Culture   Result Value Ref Range    Bacteria Urine Few (A) None Seen /HPF    RBC Urine None Seen 0-2 /HPF /HPF    WBC Urine 0-5 0-5 /HPF /HPF    Squamous  Epithelials Urine Few (A) None Seen /LPF    Narrative    Urine Culture not indicated

## 2023-12-25 NOTE — PATIENT INSTRUCTIONS
Alternate Ibuprofen 400-600 mg (2-3 tabs) and acetaminophen 1000 mg (3 regular strength or 2 extra strength) every 6 hours for 1 week then as needed for pain.     Example schedule for regular pain  8 AM- ibuprofen   2 PM- acetaminophen   8 PM - ibuprofen    Example schedule for severe pain  8 AM - ibuprofen  11 AM -acetaminophen  2 PM- ibuprofen  5 PM- acetaminophen  8 PM- ibuprofen

## 2024-07-13 ENCOUNTER — HEALTH MAINTENANCE LETTER (OUTPATIENT)
Age: 39
End: 2024-07-13

## 2025-02-27 ENCOUNTER — APPOINTMENT (OUTPATIENT)
Dept: RADIOLOGY | Facility: CLINIC | Age: 40
DRG: 390 | End: 2025-02-27
Attending: EMERGENCY MEDICINE
Payer: COMMERCIAL

## 2025-02-27 ENCOUNTER — HOSPITAL ENCOUNTER (INPATIENT)
Facility: CLINIC | Age: 40
End: 2025-02-27
Attending: EMERGENCY MEDICINE
Payer: COMMERCIAL

## 2025-02-27 ENCOUNTER — APPOINTMENT (OUTPATIENT)
Dept: ULTRASOUND IMAGING | Facility: CLINIC | Age: 40
DRG: 390 | End: 2025-02-27
Attending: EMERGENCY MEDICINE
Payer: COMMERCIAL

## 2025-02-27 ENCOUNTER — APPOINTMENT (OUTPATIENT)
Dept: CT IMAGING | Facility: CLINIC | Age: 40
DRG: 390 | End: 2025-02-27
Attending: EMERGENCY MEDICINE
Payer: COMMERCIAL

## 2025-02-27 VITALS
HEART RATE: 63 BPM | RESPIRATION RATE: 16 BRPM | TEMPERATURE: 98.1 F | DIASTOLIC BLOOD PRESSURE: 66 MMHG | SYSTOLIC BLOOD PRESSURE: 112 MMHG | HEIGHT: 69 IN | OXYGEN SATURATION: 100 % | BODY MASS INDEX: 29.03 KG/M2 | WEIGHT: 196 LBS

## 2025-02-27 DIAGNOSIS — K56.609 SMALL BOWEL OBSTRUCTION (H): ICD-10-CM

## 2025-02-27 LAB
ALBUMIN SERPL BCG-MCNC: 4.9 G/DL (ref 3.5–5.2)
ALBUMIN UR-MCNC: 30 MG/DL
ALP SERPL-CCNC: 54 U/L (ref 40–150)
ALT SERPL W P-5'-P-CCNC: 17 U/L (ref 0–70)
ANION GAP SERPL CALCULATED.3IONS-SCNC: 7 MMOL/L (ref 7–15)
APPEARANCE UR: CLEAR
AST SERPL W P-5'-P-CCNC: 26 U/L (ref 0–45)
BILIRUB DIRECT SERPL-MCNC: 0.25 MG/DL (ref 0–0.3)
BILIRUB SERPL-MCNC: 0.6 MG/DL
BILIRUB UR QL STRIP: NEGATIVE
BUN SERPL-MCNC: 11.5 MG/DL (ref 6–20)
CALCIUM SERPL-MCNC: 10.5 MG/DL (ref 8.8–10.4)
CHLORIDE SERPL-SCNC: 101 MMOL/L (ref 98–107)
COLOR UR AUTO: YELLOW
CREAT SERPL-MCNC: 1.04 MG/DL (ref 0.67–1.17)
EGFRCR SERPLBLD CKD-EPI 2021: >90 ML/MIN/1.73M2
ERYTHROCYTE [DISTWIDTH] IN BLOOD BY AUTOMATED COUNT: 13 % (ref 10–15)
GLUCOSE SERPL-MCNC: 134 MG/DL (ref 70–99)
GLUCOSE UR STRIP-MCNC: NEGATIVE MG/DL
HCO3 SERPL-SCNC: 29 MMOL/L (ref 22–29)
HCT VFR BLD AUTO: 42.7 % (ref 40–53)
HGB BLD-MCNC: 14.7 G/DL (ref 13.3–17.7)
HGB UR QL STRIP: NEGATIVE
KETONES UR STRIP-MCNC: ABNORMAL MG/DL
LEUKOCYTE ESTERASE UR QL STRIP: NEGATIVE
LIPASE SERPL-CCNC: 30 U/L (ref 13–60)
MCH RBC QN AUTO: 27.6 PG (ref 26.5–33)
MCHC RBC AUTO-ENTMCNC: 34.4 G/DL (ref 31.5–36.5)
MCV RBC AUTO: 80 FL (ref 78–100)
MUCOUS THREADS #/AREA URNS LPF: PRESENT /LPF
NITRATE UR QL: NEGATIVE
PH UR STRIP: 7 [PH] (ref 5–7)
PLATELET # BLD AUTO: 222 10E3/UL (ref 150–450)
POTASSIUM SERPL-SCNC: 3.9 MMOL/L (ref 3.4–5.3)
PROT SERPL-MCNC: 8.3 G/DL (ref 6.4–8.3)
RBC # BLD AUTO: 5.33 10E6/UL (ref 4.4–5.9)
RBC URINE: <1 /HPF
SODIUM SERPL-SCNC: 137 MMOL/L (ref 135–145)
SP GR UR STRIP: 1.03 (ref 1–1.03)
SQUAMOUS EPITHELIAL: <1 /HPF
UROBILINOGEN UR STRIP-MCNC: <2 MG/DL
WBC # BLD AUTO: 15.9 10E3/UL (ref 4–11)
WBC URINE: 5 /HPF

## 2025-02-27 PROCEDURE — 96375 TX/PRO/DX INJ NEW DRUG ADDON: CPT

## 2025-02-27 PROCEDURE — 96374 THER/PROPH/DIAG INJ IV PUSH: CPT | Mod: 59

## 2025-02-27 PROCEDURE — 120N000001 HC R&B MED SURG/OB

## 2025-02-27 PROCEDURE — 85041 AUTOMATED RBC COUNT: CPT | Performed by: EMERGENCY MEDICINE

## 2025-02-27 PROCEDURE — 80048 BASIC METABOLIC PNL TOTAL CA: CPT | Performed by: EMERGENCY MEDICINE

## 2025-02-27 PROCEDURE — 36415 COLL VENOUS BLD VENIPUNCTURE: CPT | Performed by: EMERGENCY MEDICINE

## 2025-02-27 PROCEDURE — 999N000065 XR CHEST PORT 1 VIEW

## 2025-02-27 PROCEDURE — 76705 ECHO EXAM OF ABDOMEN: CPT

## 2025-02-27 PROCEDURE — 250N000011 HC RX IP 250 OP 636: Performed by: EMERGENCY MEDICINE

## 2025-02-27 PROCEDURE — 74177 CT ABD & PELVIS W/CONTRAST: CPT

## 2025-02-27 PROCEDURE — 250N000013 HC RX MED GY IP 250 OP 250 PS 637: Performed by: EMERGENCY MEDICINE

## 2025-02-27 PROCEDURE — 99207 PR APP CREDIT; MD BILLING SHARED VISIT: CPT | Mod: FS | Performed by: INTERNAL MEDICINE

## 2025-02-27 PROCEDURE — 99223 1ST HOSP IP/OBS HIGH 75: CPT | Mod: FS

## 2025-02-27 PROCEDURE — 83690 ASSAY OF LIPASE: CPT | Performed by: EMERGENCY MEDICINE

## 2025-02-27 PROCEDURE — 85048 AUTOMATED LEUKOCYTE COUNT: CPT | Performed by: EMERGENCY MEDICINE

## 2025-02-27 PROCEDURE — 82248 BILIRUBIN DIRECT: CPT | Performed by: EMERGENCY MEDICINE

## 2025-02-27 PROCEDURE — 85018 HEMOGLOBIN: CPT | Performed by: EMERGENCY MEDICINE

## 2025-02-27 PROCEDURE — 81001 URINALYSIS AUTO W/SCOPE: CPT | Performed by: EMERGENCY MEDICINE

## 2025-02-27 PROCEDURE — 258N000003 HC RX IP 258 OP 636

## 2025-02-27 PROCEDURE — 99285 EMERGENCY DEPT VISIT HI MDM: CPT | Mod: 25

## 2025-02-27 RX ORDER — MAGNESIUM HYDROXIDE/ALUMINUM HYDROXICE/SIMETHICONE 120; 1200; 1200 MG/30ML; MG/30ML; MG/30ML
15 SUSPENSION ORAL ONCE
Status: COMPLETED | OUTPATIENT
Start: 2025-02-27 | End: 2025-02-27

## 2025-02-27 RX ORDER — ONDANSETRON 4 MG/1
4 TABLET, ORALLY DISINTEGRATING ORAL EVERY 6 HOURS PRN
Status: ACTIVE | OUTPATIENT
Start: 2025-02-27

## 2025-02-27 RX ORDER — ONDANSETRON 2 MG/ML
4 INJECTION INTRAMUSCULAR; INTRAVENOUS ONCE
Status: COMPLETED | OUTPATIENT
Start: 2025-02-27 | End: 2025-02-27

## 2025-02-27 RX ORDER — ACETAMINOPHEN 325 MG/1
325-650 TABLET ORAL EVERY 6 HOURS PRN
COMMUNITY

## 2025-02-27 RX ORDER — AMOXICILLIN 250 MG
1 CAPSULE ORAL 2 TIMES DAILY PRN
Status: ACTIVE | OUTPATIENT
Start: 2025-02-27

## 2025-02-27 RX ORDER — HYDROMORPHONE HCL IN WATER/PF 6 MG/30 ML
0.4 PATIENT CONTROLLED ANALGESIA SYRINGE INTRAVENOUS
Status: ACTIVE | OUTPATIENT
Start: 2025-02-27

## 2025-02-27 RX ORDER — KETOROLAC TROMETHAMINE 15 MG/ML
15 INJECTION, SOLUTION INTRAMUSCULAR; INTRAVENOUS ONCE
Status: COMPLETED | OUTPATIENT
Start: 2025-02-27 | End: 2025-02-27

## 2025-02-27 RX ORDER — MORPHINE SULFATE 10 MG/ML
6 INJECTION, SOLUTION INTRAMUSCULAR; INTRAVENOUS ONCE
Status: COMPLETED | OUTPATIENT
Start: 2025-02-27 | End: 2025-02-27

## 2025-02-27 RX ORDER — HYDROMORPHONE HCL IN WATER/PF 6 MG/30 ML
0.2 PATIENT CONTROLLED ANALGESIA SYRINGE INTRAVENOUS
Status: ACTIVE | OUTPATIENT
Start: 2025-02-27

## 2025-02-27 RX ORDER — AMOXICILLIN 250 MG
2 CAPSULE ORAL 2 TIMES DAILY PRN
Status: ACTIVE | OUTPATIENT
Start: 2025-02-27

## 2025-02-27 RX ORDER — LIDOCAINE 40 MG/G
CREAM TOPICAL
Status: ACTIVE | OUTPATIENT
Start: 2025-02-27

## 2025-02-27 RX ORDER — ACETAMINOPHEN 325 MG/1
650 TABLET ORAL EVERY 4 HOURS PRN
Status: ACTIVE | OUTPATIENT
Start: 2025-02-27

## 2025-02-27 RX ORDER — ACETAMINOPHEN 650 MG/1
650 SUPPOSITORY RECTAL EVERY 4 HOURS PRN
Status: ACTIVE | OUTPATIENT
Start: 2025-02-27

## 2025-02-27 RX ORDER — IOPAMIDOL 755 MG/ML
90 INJECTION, SOLUTION INTRAVASCULAR ONCE
Status: COMPLETED | OUTPATIENT
Start: 2025-02-27 | End: 2025-02-27

## 2025-02-27 RX ORDER — IOPAMIDOL 755 MG/ML
90 INJECTION, SOLUTION INTRAVASCULAR ONCE
Status: ACTIVE | OUTPATIENT
Start: 2025-02-27

## 2025-02-27 RX ORDER — SODIUM CHLORIDE 9 MG/ML
INJECTION, SOLUTION INTRAVENOUS CONTINUOUS
Status: ACTIVE | OUTPATIENT
Start: 2025-02-27 | End: 2025-02-28

## 2025-02-27 RX ORDER — ONDANSETRON 2 MG/ML
4 INJECTION INTRAMUSCULAR; INTRAVENOUS EVERY 6 HOURS PRN
Status: ACTIVE | OUTPATIENT
Start: 2025-02-27

## 2025-02-27 RX ADMIN — ALUMINUM HYDROXIDE, MAGNESIUM HYDROXIDE, AND SIMETHICONE 15 ML: 1200; 120; 1200 SUSPENSION ORAL at 17:51

## 2025-02-27 RX ADMIN — SODIUM CHLORIDE: 0.9 INJECTION, SOLUTION INTRAVENOUS at 22:40

## 2025-02-27 RX ADMIN — KETOROLAC TROMETHAMINE 15 MG: 15 INJECTION, SOLUTION INTRAMUSCULAR; INTRAVENOUS at 17:51

## 2025-02-27 RX ADMIN — MORPHINE SULFATE 6 MG: 10 INJECTION, SOLUTION INTRAMUSCULAR; INTRAVENOUS at 21:20

## 2025-02-27 RX ADMIN — ONDANSETRON 4 MG: 2 INJECTION, SOLUTION INTRAMUSCULAR; INTRAVENOUS at 21:20

## 2025-02-27 RX ADMIN — FAMOTIDINE 20 MG: 10 INJECTION, SOLUTION INTRAVENOUS at 17:52

## 2025-02-27 RX ADMIN — IOPAMIDOL 90 ML: 755 INJECTION, SOLUTION INTRAVENOUS at 18:04

## 2025-02-27 ASSESSMENT — ACTIVITIES OF DAILY LIVING (ADL)
ADLS_ACUITY_SCORE: 41

## 2025-02-27 ASSESSMENT — COLUMBIA-SUICIDE SEVERITY RATING SCALE - C-SSRS
1. IN THE PAST MONTH, HAVE YOU WISHED YOU WERE DEAD OR WISHED YOU COULD GO TO SLEEP AND NOT WAKE UP?: NO
6. HAVE YOU EVER DONE ANYTHING, STARTED TO DO ANYTHING, OR PREPARED TO DO ANYTHING TO END YOUR LIFE?: NO
2. HAVE YOU ACTUALLY HAD ANY THOUGHTS OF KILLING YOURSELF IN THE PAST MONTH?: NO

## 2025-02-27 NOTE — ED TRIAGE NOTES
The patient presents to the ED with abdominal cramping and pain that began this morning. The patient reports he thought it was gas, but the pain has not subsided. The patient denies nausea, vomiting, diarrhea or constipation. Denies fever. Denies history of abdominal surgeries.

## 2025-02-28 VITALS
HEART RATE: 85 BPM | RESPIRATION RATE: 16 BRPM | DIASTOLIC BLOOD PRESSURE: 67 MMHG | WEIGHT: 196 LBS | OXYGEN SATURATION: 99 % | HEIGHT: 69 IN | SYSTOLIC BLOOD PRESSURE: 114 MMHG | BODY MASS INDEX: 29.03 KG/M2 | TEMPERATURE: 97.9 F

## 2025-02-28 LAB
ANION GAP SERPL CALCULATED.3IONS-SCNC: 6 MMOL/L (ref 7–15)
BUN SERPL-MCNC: 9.1 MG/DL (ref 6–20)
CALCIUM SERPL-MCNC: 9.4 MG/DL (ref 8.8–10.4)
CHLORIDE SERPL-SCNC: 104 MMOL/L (ref 98–107)
CREAT SERPL-MCNC: 1.21 MG/DL (ref 0.67–1.17)
EGFRCR SERPLBLD CKD-EPI 2021: 79 ML/MIN/1.73M2
ERYTHROCYTE [DISTWIDTH] IN BLOOD BY AUTOMATED COUNT: 13.1 % (ref 10–15)
GLUCOSE SERPL-MCNC: 99 MG/DL (ref 70–99)
HCO3 SERPL-SCNC: 29 MMOL/L (ref 22–29)
HCT VFR BLD AUTO: 39.2 % (ref 40–53)
HGB BLD-MCNC: 13.7 G/DL (ref 13.3–17.7)
MCH RBC QN AUTO: 28 PG (ref 26.5–33)
MCHC RBC AUTO-ENTMCNC: 34.9 G/DL (ref 31.5–36.5)
MCV RBC AUTO: 80 FL (ref 78–100)
PLATELET # BLD AUTO: 186 10E3/UL (ref 150–450)
POTASSIUM SERPL-SCNC: 3.9 MMOL/L (ref 3.4–5.3)
RBC # BLD AUTO: 4.89 10E6/UL (ref 4.4–5.9)
SODIUM SERPL-SCNC: 139 MMOL/L (ref 135–145)
WBC # BLD AUTO: 9.9 10E3/UL (ref 4–11)

## 2025-02-28 PROCEDURE — 99207 PR NO BILLABLE SERVICE THIS VISIT: CPT | Performed by: HOSPITALIST

## 2025-02-28 PROCEDURE — 80048 BASIC METABOLIC PNL TOTAL CA: CPT

## 2025-02-28 PROCEDURE — 99239 HOSP IP/OBS DSCHRG MGMT >30: CPT | Performed by: HOSPITALIST

## 2025-02-28 PROCEDURE — 250N000011 HC RX IP 250 OP 636: Mod: JZ

## 2025-02-28 PROCEDURE — 258N000003 HC RX IP 258 OP 636

## 2025-02-28 PROCEDURE — 85027 COMPLETE CBC AUTOMATED: CPT

## 2025-02-28 PROCEDURE — 36415 COLL VENOUS BLD VENIPUNCTURE: CPT

## 2025-02-28 PROCEDURE — 99222 1ST HOSP IP/OBS MODERATE 55: CPT

## 2025-02-28 PROCEDURE — 258N000003 HC RX IP 258 OP 636: Performed by: HOSPITALIST

## 2025-02-28 RX ORDER — NALOXONE HYDROCHLORIDE 0.4 MG/ML
0.2 INJECTION, SOLUTION INTRAMUSCULAR; INTRAVENOUS; SUBCUTANEOUS
Status: DISCONTINUED | OUTPATIENT
Start: 2025-02-28 | End: 2025-02-28 | Stop reason: HOSPADM

## 2025-02-28 RX ORDER — NALOXONE HYDROCHLORIDE 0.4 MG/ML
0.4 INJECTION, SOLUTION INTRAMUSCULAR; INTRAVENOUS; SUBCUTANEOUS
Status: DISCONTINUED | OUTPATIENT
Start: 2025-02-28 | End: 2025-02-28 | Stop reason: HOSPADM

## 2025-02-28 RX ORDER — SODIUM CHLORIDE, SODIUM LACTATE, POTASSIUM CHLORIDE, CALCIUM CHLORIDE 600; 310; 30; 20 MG/100ML; MG/100ML; MG/100ML; MG/100ML
INJECTION, SOLUTION INTRAVENOUS CONTINUOUS
Status: DISCONTINUED | OUTPATIENT
Start: 2025-02-28 | End: 2025-02-28 | Stop reason: HOSPADM

## 2025-02-28 RX ADMIN — SODIUM CHLORIDE, SODIUM LACTATE, POTASSIUM CHLORIDE, AND CALCIUM CHLORIDE: .6; .31; .03; .02 INJECTION, SOLUTION INTRAVENOUS at 10:52

## 2025-02-28 RX ADMIN — HYDROMORPHONE HYDROCHLORIDE 0.2 MG: 0.2 INJECTION, SOLUTION INTRAMUSCULAR; INTRAVENOUS; SUBCUTANEOUS at 10:59

## 2025-02-28 RX ADMIN — SODIUM CHLORIDE: 0.9 INJECTION, SOLUTION INTRAVENOUS at 07:40

## 2025-02-28 ASSESSMENT — ACTIVITIES OF DAILY LIVING (ADL)
ADLS_ACUITY_SCORE: 41
ADLS_ACUITY_SCORE: 42
ADLS_ACUITY_SCORE: 41
ADLS_ACUITY_SCORE: 41
ADLS_ACUITY_SCORE: 42
ADLS_ACUITY_SCORE: 41
ADLS_ACUITY_SCORE: 41
ADLS_ACUITY_SCORE: 42
ADLS_ACUITY_SCORE: 42
ADLS_ACUITY_SCORE: 41
ADLS_ACUITY_SCORE: 41
ADLS_ACUITY_SCORE: 42
ADLS_ACUITY_SCORE: 42
ADLS_ACUITY_SCORE: 41

## 2025-02-28 NOTE — PROGRESS NOTES
[facility] Medicine PROGRESS NOTE      Identification/Summary:   39-year-old male who is otherwise healthy presented with abdominal pain and vomiting.  Imaging with possible early or partial small bowel obstruction.  Surgery consulted, NG tube placed.  No prior history of abdominal surgeries.    Assessment and Plan:  Possible partial or early small bowel obstruction  Clinically feeling better now  Has NG tube in place  General Surgery consulted  Continue antiemetics, pain meds as needed  Continue IV fluids, keep n.p.o. for now    Diet: NPO for Medical/Clinical Reasons Except for: No Exceptions  Fluids: LR at 125/h  Pain meds: Dilaudid as needed  Therapy: None  DVT Prophylaxis: Ambulation  Code Status: Full Code  Medically Ready for Discharge: Anticipated Tomorrow        Interval History/Subjective:  Feeling better.  Abdominal pain is improved.  Nausea improved.  No bowel movements since has been here but has been passing some gas.  No dysuria, lower extremity edema.  No chest pain or shortness of breath at rest.    Physical Exam/Objective:  Gen: no acute distress, comfortable, alert, pleasant, appears fit  ENT: no scleral icterus, NG tube in place  Pulm: lungs are clear without wheezing, crackles  CV: regular rate and rhythm  GI: abdomen is soft, nondistended, nontender, bowel sounds are diminished  MSK: no significant peripheral pitting edema, no noticeable swelling/erythema or warmth of joints.  Derm: Warm and dry  Psych: appropriate affect    Medications:   Current Facility-Administered Medications   Medication Dose Route Frequency Provider Last Rate Last Admin    iopamidol (ISOVUE-370) solution 90 mL  90 mL Intravenous Once Chana Young PA-C        sodium chloride (PF) 0.9% PF flush 3 mL  3 mL Intracatheter Q8H Shania Varghese PA-C   3 mL at 02/27/25 2300     Clinically Significant Risk Factors Present on Admission                             # Overweight: Estimated body mass index is 28.94 kg/m  as  "calculated from the following:    Height as of this encounter: 1.753 m (5' 9\").    Weight as of this encounter: 88.9 kg (196 lb).                 Kyle Lane DO   [facility]              "

## 2025-02-28 NOTE — H&P
"[facility]    History and Physical - Hospitalist Service       Date of Admission:  2/27/2025    Assessment & Plan      Peter Borden is a 39 year old male with PMHx significant for GERD, anxiety, no past abdominal surgical history who was admitted on 2/27/2025 for supportive management and General Surgery consult for acute small bowel obstruction.    ED Course: Hypertensive in ED up to 145/83, otherwise hemodynamically stable, on room air, afebrile. Lab workup is remarkable for leukocytosis 15.9, remainder of CBC, BMP, LFT grossly unremarkable.  Lipase normal at 30.  UA with some proteinuria, not suggestive of infection.  Ultrasound of right upper quadrant of the abdomen is normal.  CT A/P with contrast demonstrates \"Wall thickening involving a loop of jejunum in the left midabdomen with mild upstream small bowel distention compatible with early or partial small bowel obstruction. A site of wall thickening is present and it is unclear whether this is due to a primary enteritis or could be reactive wall thickening in relation to a band or adhesion. Mild mesenteric congestion within the left side of the abdomen.\"  Given 6 mg IV morphine, 15 mg IV Toradol, Maalox, 20 mg IV Pepcid, Zofran.  NG tube placed in ED, subsequent chest x-ray confirms proper placement, clear lungs.      Small bowel obstruction, acute  Presenting with central/epigastric abdominal pain which onset in the morning around 9:30am, a few hours after eating. Last BM 2/27 afternoon and was a bit loose. CT A/P reveals early or partial SBO involving a loop of jejunum. Has no previous abdominal surgical history. No personal or family history of colon cancer or other bowel diseases.  - Keep NPO  - Maintain NG tube at low intermittent  - Symptom control with analgesics, antiemetics  - Continuous IV NS at 100 mL/hr overnight  - General Surgery consult  - Encourage ambulation  - Monitor for BM  - AM labs: CBC, BMP          Diet: NPO " "for Medical/Clinical Reasons Except for: No Exceptions  DVT Prophylaxis: Pneumatic Compression Devices  Camacho Catheter: Not present  Lines: None     Cardiac Monitoring: None  Code Status: Full Code    Clinically Significant Risk Factors Present on Admission                             # Overweight: Estimated body mass index is 28.94 kg/m  as calculated from the following:    Height as of this encounter: 1.753 m (5' 9\").    Weight as of this encounter: 88.9 kg (196 lb).              Disposition Plan     Medically Ready for Discharge: Anticipated in 2-4 Days         The patient's care was discussed with the Attending Physician, Dr. Angel Quintero .    Shania Varghese PA-C  Hospitalist Service  [facility]  Securely message with [employer] (more info)  Text page via [facility] Paging/Directory     ______________________________________________________________________    Chief Complaint   Abdominal pain    History is obtained from the patient    History of Present Illness   Peter Borden is a 39 year old male with PMHx significant for GERD, anxiety who presented to the ED for abdominal pain.    Patient reports onset of central/epigastric abdominal pain around 9:30am this morning, a few hours after eating. Pain has been gradually getting worse since onset. No nausea, vomiting, or diarrhea. Last BM this afternoon was loose but not watery. No history of SBO in the past. No past abdominal surgical history. No personal or family history of colon cancer or other bowel diseases.      Past Medical History    Past Medical History:   Diagnosis Date    Anxiety 5/22/2017    GERD (gastroesophageal reflux disease)        Past Surgical History   Past Surgical History:   Procedure Laterality Date    NO HISTORY OF SURGERY         Prior to Admission Medications   Prior to Admission Medications   Prescriptions Last Dose Informant Patient Reported? Taking?   acetaminophen (TYLENOL) 325 MG tablet 2/27/2025 at  3:00 PM  Yes " Yes   Sig: Take 325-650 mg by mouth every 6 hours as needed for mild pain.      Facility-Administered Medications: None        Review of Systems    The 10 point Review of Systems is negative other than noted in the HPI or here.     Social History   I have reviewed this patient's social history and updated it with pertinent information if needed.  Social History     Tobacco Use    Smoking status: Never    Smokeless tobacco: Never   Vaping Use    Vaping status: Never Used   Substance Use Topics    Alcohol use: Yes     Comment: social, minimal     Drug use: No         Allergies   No Known Allergies     Physical Exam   Vital Signs: Temp: 98.1  F (36.7  C) Temp src: Oral BP: 136/80 Pulse: 74   Resp: 16 SpO2: 99 % O2 Device: None (Room air)    Weight: 196 lbs 0 oz    General Appearance: Awake, alert, in no acute distress.  HEENT: NG tube in place.  Respiratory: Clear to auscultation bilaterally. Normal respiratory effort on room air.  Cardiovascular: Regular rate and rhythm, no murmur. Extremities are warm and well-perfused.  GI: Soft, non-tender, non-distended. No rebound or guarding. Active bowel sounds.  Skin: No obvious rashes or lesions on observed skin.  Musculoskeletal: Able to move all extremities freely. No lower extremity edema.  Neurologic: Alert and oriented x 3. Strength and sensation are grossly equal and intact in bilateral upper and lower extremities.  Psychiatric: Calm, pleasant, cooperative with exam.      Medical Decision Making       50 MINUTES SPENT BY ME on the date of service doing chart review, history, exam, documentation & further activities per the note.      Data     I have personally reviewed the following data over the past 24 hrs:    15.9 (H)  \   14.7   / 222     137 101 11.5 /  134 (H)   3.9 29 1.04 \     ALT: 17 AST: 26 AP: 54 TBILI: 0.6   ALB: 4.9 TOT PROTEIN: 8.3 LIPASE: 30       Imaging results reviewed over the past 24 hrs:   Recent Results (from the past 24 hours)   CT Abdomen Pelvis  w Contrast    Narrative    EXAM: CT ABDOMEN PELVIS W CONTRAST  LOCATION: [facility]  DATE: 2/27/2025    INDICATION: Abdominal pain, diffuse, mainly in the upper abdomen.  COMPARISON: None.  TECHNIQUE: CT scan of the abdomen and pelvis was performed following injection of IV contrast. Multiplanar reformats were obtained. Dose reduction techniques were used.  CONTRAST: Isovue-370, 90 mL.    FINDINGS:   LOWER CHEST: Normal.    HEPATOBILIARY: Tiny cyst in the inferior right hepatic lobe.    PANCREAS: Normal.    SPLEEN: Normal.    ADRENAL GLANDS: Normal.    KIDNEYS/BLADDER: Normal.    BOWEL: Wall thickening involving a loop of jejunum in the left midabdomen with mild upstream small bowel distention compatible with early or partial small bowel obstruction. At the site of wall thickening there is mild transition and it is unclear   whether this is due to a primary enteritis or could be reactive wall thickening in relation to a band or adhesion. Mild mesenteric congestion within the left side of the abdomen.    LYMPH NODES: Mildly enlarged mesenteric lymph nodes compatible with reactive nodes.    VASCULATURE: Normal.    PELVIC ORGANS: Trace ascites.    MUSCULOSKELETAL: Normal.      Impression    IMPRESSION:   1.  Wall thickening involving a loop of jejunum in the left midabdomen with mild upstream small bowel distention compatible with early or partial small bowel obstruction. A site of wall thickening is present and it is unclear whether this is due to a   primary enteritis or could be reactive wall thickening in relation to a band or adhesion. Mild mesenteric congestion within the left side of the abdomen.   US Abdomen Limited    Narrative    EXAM: US ABDOMEN LIMITED  LOCATION: [facility]  DATE: 2/27/2025    INDICATION: upper abdominal pain, tenderness ruq  COMPARISON: None.  TECHNIQUE: Limited abdominal ultrasound.    FINDINGS:    GALLBLADDER: Negative for gallstones.  Gallbladder mostly contracted.    BILE DUCTS: No biliary dilatation. The common duct measures 4 mm.    LIVER: Normal parenchyma with smooth contour. No focal mass. The portal vein is patent with flow in the normal direction.    RIGHT KIDNEY: No hydronephrosis.    PANCREAS: The visualized portions are normal.    No ascites.      Impression    IMPRESSION:  1.  Normal limited abdominal ultrasound.       XR Chest Port 1 View    Narrative    EXAM: XR CHEST PORT 1 VIEW  LOCATION: [facility]  DATE: 2/27/2025    INDICATION: NG tube placement  COMPARISON: None.      Impression    IMPRESSION: Enteric tube within the stomach. Heart normal in size. Lungs are clear.

## 2025-02-28 NOTE — ED PROVIDER NOTES
"Emergency Department Midlevel Supervisory Note     I had a face to face encounter with this patient seen by the Advanced Practice Provider (LENIN). I personally made/approved the management plan and take responsibility for the patient management. I personally saw patient and performed a substantive portion of the visit including all aspects of the medical decision making.     ED Course:  8:00 PM Chana Yonug PA-C staffed patient with me. I agree with their assessment and plan of management, and I will see the patient.  9:30 PM I met with the patient to introduce myself, gather additional history, perform my initial exam, and discuss the plan.   10:00 PM I discussed case with Dr Quintero and Dr Molina    Brief HPI:     Peter Borden is a 39 year old male who presents for evaluation of abdominal pain      Brief Physical Exam: /66   Pulse 61   Temp 98.1  F (36.7  C) (Oral)   Resp 16   Ht 1.753 m (5' 9\")   Wt 88.9 kg (196 lb)   SpO2 98%   BMI 28.94 kg/m    Constitutional:  Alert, in no acute distress  EYES: Conjunctivae clear  HENT:  Atraumatic  Respiratory:  Respirations even, unlabored, in no acute respiratory distress  Cardiovascular:  Regular rate and rhythm, good peripheral perfusion  GI: Soft, mildly distended and mild diffuse tenderness  Musculoskeletal:  Moves all 4 extremities equally, grossly symmetrical strength  Integument: Warm & dry. No appreciable rash, erythema.  Neurologic:  Alert & oriented, speech clear and fluent, no focal deficits noted  Psych: Normal mood and affect       MDM:  Patient is a 39-year-old male who presents to the emergency department for evaluation of abdominal pain.  CT demonstrates a proximal small bowel obstruction.  NG tube was placed and patient more comfortable from an abdominal pain and nausea standpoint after this.  No previous abdominal surgeries.  Discussed admission for bowel rest, NG tube, further consultations.  Did discuss case with general [facility] " surgery who will add them to their list for consult tomorrow.  No immediate recommendations.  Discussed case with hospitalist.     Agree with remainder of initial ED course and plan as documented by LENIN      1. Small bowel obstruction (H)        Labs and Imaging:  Results for orders placed or performed during the hospital encounter of 02/27/25   US Abdomen Limited    Impression    IMPRESSION:  1.  Normal limited abdominal ultrasound.       CT Abdomen Pelvis w Contrast    Impression    IMPRESSION:   1.  Wall thickening involving a loop of jejunum in the left midabdomen with mild upstream small bowel distention compatible with early or partial small bowel obstruction. A site of wall thickening is present and it is unclear whether this is due to a   primary enteritis or could be reactive wall thickening in relation to a band or adhesion. Mild mesenteric congestion within the left side of the abdomen.   XR Chest Port 1 View    Impression    IMPRESSION: Enteric tube within the stomach. Heart normal in size. Lungs are clear.   CBC (+ platelets, no diff)   Result Value Ref Range    WBC Count 15.9 (H) 4.0 - 11.0 10e3/uL    RBC Count 5.33 4.40 - 5.90 10e6/uL    Hemoglobin 14.7 13.3 - 17.7 g/dL    Hematocrit 42.7 40.0 - 53.0 %    MCV 80 78 - 100 fL    MCH 27.6 26.5 - 33.0 pg    MCHC 34.4 31.5 - 36.5 g/dL    RDW 13.0 10.0 - 15.0 %    Platelet Count 222 150 - 450 10e3/uL   Basic metabolic panel   Result Value Ref Range    Sodium 137 135 - 145 mmol/L    Potassium 3.9 3.4 - 5.3 mmol/L    Chloride 101 98 - 107 mmol/L    Carbon Dioxide (CO2) 29 22 - 29 mmol/L    Anion Gap 7 7 - 15 mmol/L    Urea Nitrogen 11.5 6.0 - 20.0 mg/dL    Creatinine 1.04 0.67 - 1.17 mg/dL    GFR Estimate >90 >60 mL/min/1.73m2    Calcium 10.5 (H) 8.8 - 10.4 mg/dL    Glucose 134 (H) 70 - 99 mg/dL   Hepatic panel   Result Value Ref Range    Protein Total 8.3 6.4 - 8.3 g/dL    Albumin 4.9 3.5 - 5.2 g/dL    Bilirubin Total 0.6 <=1.2 mg/dL    Alkaline Phosphatase  54 40 - 150 U/L    AST 26 0 - 45 U/L    ALT 17 0 - 70 U/L    Bilirubin Direct 0.25 0.00 - 0.30 mg/dL   Result Value Ref Range    Lipase 30 13 - 60 U/L   UA with Microscopic reflex to Culture    Specimen: Urine, Clean Catch   Result Value Ref Range    Color Urine Yellow Colorless, Straw, Light Yellow, Yellow    Appearance Urine Clear Clear    Glucose Urine Negative Negative mg/dL    Bilirubin Urine Negative Negative    Ketones Urine Trace (A) Negative mg/dL    Specific Gravity Urine 1.028 1.001 - 1.030    Blood Urine Negative Negative    pH Urine 7.0 5.0 - 7.0    Protein Albumin Urine 30 (A) Negative mg/dL    Urobilinogen Urine <2.0 <2.0 mg/dL    Nitrite Urine Negative Negative    Leukocyte Esterase Urine Negative Negative    Mucus Urine Present (A) None Seen /LPF    RBC Urine <1 <=2 /HPF    WBC Urine 5 <=5 /HPF    Squamous Epithelials Urine <1 <=1 /HPF       Procedures:  I was present for the key portions of procedures documented in LENIN/midlevel note, see midlevel note for further details.    Damien Steven MD  [facility] EMERGENCY ROOM  [address] [id number] [phone]       Damien Steven MD  02/27/25 2314

## 2025-02-28 NOTE — DISCHARGE SUMMARY
[facility] MEDICINE  DISCHARGE SUMMARY     Primary Care Physician: No Ref-Primary, Physician  Admission Date: 2/27/2025   Discharge Provider: Kyle Lane DO Discharge Date: 2/28/2025   Diet:   Active Diet and Nourishment Order   Procedures    Clear Liquid Diet    Diet       Code Status: Full Code   Activity: As tolerated        Condition at Discharge: Stable     REASON FOR PRESENTATION(See Admission Note for Details)   Abdominal pain, nausea and vomiting  PRINCIPAL & ACTIVE DISCHARGE DIAGNOSES   Possible partial small bowel obstruction  PENDING LABS     Unresulted Labs Ordered in the Past 30 Days of this Admission       No orders found for last 31 day(s).          PROCEDURES ( this hospitalization only)      RECOMMENDATIONS TO OUTPATIENT PROVIDER FOR F/U VISIT     Follow-up Appointments       Follow-up and recommended labs and tests       Follow up with primary care provider, Physician No Ref-Primary, within 7 days for hospital follow- up.  No follow up labs or test are needed.              See if his abdominal symptoms have resolved  DISPOSITION   Home  SUMMARY OF HOSPITAL COURSE:    Peter Borden is a 39 year old male who presents with abdominal pain that started yesterday morning. Afebrile and no tachycardia. Labs notable for resolved leukocytosis (WBC 15.9 to 9.9). Enteric panel pending. CT scan shows a loop of small bowel with wall thickening in the left mid abdomen with mild upstream distention concerning for an early/partial SBO or possible enteritis. RUQ US with no acute findings. NGT placed in ED due to significant distention of his stomach seen on imaging although no output overnight even after being flushed. Patient reports resolution of his symptoms with no abdominal pain, nausea, or vomiting today and is also passing gas. Suspect likely his symptoms are due to enteritis vs a SBO with no history of abdominal surgeries. OK to remove NGT and trial clears. If  tolerating clears then can advance diet as tolerated.    Presuming he will tolerate oral intake discharge orders were placed for later this afternoon.  Discharge Medications with Med changes:     Current Discharge Medication List        CONTINUE these medications which have NOT CHANGED    Details   acetaminophen (TYLENOL) 325 MG tablet Take 325-650 mg by mouth every 6 hours as needed for mild pain.           Rationale for medication changes:    Not applicable  Consults   General Surgery   Anticoagulation Information    Not applicable  SIGNIFICANT IMAGING FINDINGS     Results for orders placed or performed during the hospital encounter of 02/27/25   US Abdomen Limited    Impression    IMPRESSION:  1.  Normal limited abdominal ultrasound.       CT Abdomen Pelvis w Contrast    Impression    IMPRESSION:   1.  Wall thickening involving a loop of jejunum in the left midabdomen with mild upstream small bowel distention compatible with early or partial small bowel obstruction. A site of wall thickening is present and it is unclear whether this is due to a   primary enteritis or could be reactive wall thickening in relation to a band or adhesion. Mild mesenteric congestion within the left side of the abdomen.   XR Chest Port 1 View    Impression    IMPRESSION: Enteric tube within the stomach. Heart normal in size. Lungs are clear.     SIGNIFICANT LABORATORY FINDINGS   Mildly elevated creatinine of 1.2  Initial white count of 15.9, improved to 9.9  Discharge Orders        Reason for your hospital stay    Possible bowel obstruction     Follow-up and recommended labs and tests     Follow up with primary care provider, Physician No Ref-Primary, within 7 days for hospital follow- up.  No follow up labs or test are needed.     Activity    Your activity upon discharge: activity as tolerated     Diet    Follow this diet upon discharge: Regular     Examination   Physical Exam   Temp:  [97.9  F (36.6  C)-98.1  F (36.7  C)] 97.9  F  (36.6  C)  Pulse:  [58-85] 85  Resp:  [14-16] 16  BP: (109-145)/(65-83) 114/67  SpO2:  [97 %-100 %] 99 %  Wt Readings from Last 1 Encounters:   02/27/25 88.9 kg (196 lb)       Feeling better.  Abdominal pain is improved.  Nausea improved.  No bowel movements since has been here but has been passing some gas.  No dysuria, lower extremity edema.  No chest pain or shortness of breath at rest.     Physical Exam/Objective:  Gen: no acute distress, comfortable, alert, pleasant, appears fit  ENT: no scleral icterus, NG tube in place  Pulm: lungs are clear without wheezing, crackles  CV: regular rate and rhythm  GI: abdomen is soft, nondistended, nontender, bowel sounds are diminished  MSK: no significant peripheral pitting edema, no noticeable swelling/erythema or warmth of joints.  Derm: Warm and dry  Psych: appropriate affect      Please see EMR for more detailed significant labs, imaging, consultant notes etc.    I, Kyle Lane DO, personally saw the patient today and spent greater than 30 minutes discharging this patient.    Kyle Lane DO  [facility]    CC:No Ref-Primary, Physician

## 2025-02-28 NOTE — MEDICATION SCRIBE - ADMISSION MEDICATION HISTORY
Medication Scribe Admission Medication History    Admission medication history is complete. The information provided in this note is only as accurate as the sources available at the time of the update.    Information Source(s): Patient, Clinic records, and [facility] via in-person    Pertinent Information: Patient reported no Rx medications at this time. Removed Rx meds from PTA list.     For OTC products the patient reported taking 650 mg of acetaminophen this afternoon before arrival otherwise he reported no other OTC medications/supplements at this time.     Last clinic visit did not list any medications and no fills in the last year correlate with this as well.     Changes made to PTA medication list:  Added:   Acetaminophen 325 mg  Deleted:   Clindamycin 1% lotion  Fluocinolone 0.01% oil  Ketoconazole 2% shampoo  Omeprazole 40 mg  Changed: None    Allergies reviewed with patient and updates made in EHR: yes    Medication History Completed By: Bill Hill 2/27/2025 9:52 PM    PTA Med List   Medication Sig Last Dose/Taking    acetaminophen (TYLENOL) 325 MG tablet Take 325-650 mg by mouth every 6 hours as needed for mild pain. 2/27/2025 at  3:00 PM

## 2025-02-28 NOTE — CONSULTS
General Surgery Consultation  Peter Borden MRN# [id number]   Age/Sex: 39 year old male YOB: 1985     Reason for consult: 1. Small bowel obstruction (H)            Requesting physician: Shania Varghese PA-C                    Assessment and Plan:   Assessment:  Peter Borden is a 39 year old male who presents with abdominal pain that started yesterday morning. Afebrile and no tachycardia. Labs notable for resolved leukocytosis (WBC 15.9 to 9.9). Enteric panel pending. CT scan shows a loop of small bowel with wall thickening in the left mid abdomen with mild upstream distention concerning for an early/partial SBO or possible enteritis. RUQ US with no acute findings. NGT placed in ED due to significant distention of his stomach seen on imaging although no output overnight even after being flushed. Patient reports resolution of his symptoms with no abdominal pain, nausea, or vomiting today and is also passing gas. Suspect likely his symptoms are due to enteritis vs a SBO with no history of abdominal surgeries. OK to remove NGT and trial clears. If tolerating clears then can advance diet as tolerated.    Plan:  - Remove NGT  - Trial clears and if doing well then ADAT  - Enteric panel pending  - No planned surgical intervention  - Medical management per primary team  - General surgery will continue to follow          Chief Complaint:     Chief Complaint   Patient presents with    Abdominal Pain        History is obtained from the patient and chart review.    HPI:   Peter Borden is a 39 year old male who presents with abdominal pain that started yesterday morning around 9:30 AM. States earlier in the morning he had [employer] and a lemonade then was getting ready to go to [employer] when the pain started while driving his car. Pain felt like deep cramping throughout his abdomen and not worse in one specific area. Thought it was initially due to gas. Did not try any medications help with the pain. No nausea or  vomiting. Does not recall if he was passing much gas yesterday and states he had 1 loose BM yesterday afternoon. Has not had symptoms like this in the past. Felt well the day prior to symptom onset. No history of SBO.  Denies changes to bladder function, bloody or watery stools, fever, chills.    No previous abdominal surgery. Not on blood thinners.          Past Medical History:     Past Medical History:   Diagnosis Date    Anxiety 5/22/2017    GERD (gastroesophageal reflux disease)               Past Surgical History:     Past Surgical History:   Procedure Laterality Date    NO HISTORY OF SURGERY               Social History:    reports that he has never smoked. He has never used smokeless tobacco. He reports current alcohol use. He reports that he does not use drugs.           Family History:     Family History   Problem Relation Age of Onset    No Known Problems Mother     No Known Problems Father               Allergies:   No Known Allergies           Medications:     Prior to Admission medications    Medication Sig Start Date End Date Taking? Authorizing Provider   acetaminophen (TYLENOL) 325 MG tablet Take 325-650 mg by mouth every 6 hours as needed for mild pain.   Yes Reported, Patient              Review of Systems:   The Review of Systems is negative other than noted in the HPI          Physical Exam:   Patient Vitals for the past 24 hrs:   BP Temp Temp src Pulse Resp SpO2 Height Weight   02/28/25 0740 114/67 97.9  F (36.6  C) Oral 69 16 97 % -- --   02/28/25 0312 114/65 -- -- 76 -- 100 % -- --   02/28/25 0022 109/66 98.1  F (36.7  C) Oral 58 14 100 % -- --   02/27/25 2323 -- -- -- 63 -- 100 % -- --   02/27/25 2230 112/66 -- -- 61 -- 98 % -- --   02/27/25 2221 120/72 -- -- 62 -- 98 % -- --   02/27/25 2206 123/75 -- -- 64 -- 98 % -- --   02/27/25 2151 120/74 -- -- 73 -- 99 % -- --   02/27/25 2136 125/74 -- -- 71 -- 98 % -- --   02/27/25 2115 136/80 -- -- 74 -- 99 % -- --   02/27/25 2100 (!) 145/83 -- -- 73  "-- 98 % -- --   02/27/25 2057 (!) 141/83 -- -- 71 -- 99 % -- --   02/27/25 1629 129/77 98.1  F (36.7  C) Oral 78 16 97 % 1.753 m (5' 9\") 88.9 kg (196 lb)          Intake/Output Summary (Last 24 hours) at 2/28/2025 0845  Last data filed at 2/28/2025 0740  Gross per 24 hour   Intake 1452.67 ml   Output --   Net 1452.67 ml      Constitutional:   Awake, alert, cooperative, no apparent distress, and appears stated age. NGT in place with minimal output.     Eyes:   PERRL, conjunctiva/corneas clear, EOM's intact; no scleral edema or icterus noted      ENT:   Normocephalic, without obvious abnormality, atraumatic, Lips, mucosa, and tongue normal      Lungs:   Normal respiratory effort, no accessory muscle use     Cardiovascular:   Regular rate and rhythm     Abdomen:   Soft, non-distended, non-tender. No rebound tenderness or guarding.     Musculoskeletal:   No obvious swelling, bruising or deformity     Skin:   Skin color and texture normal for patient, no rashes or lesions              Data:         All imaging studies reviewed by me.    Results for orders placed or performed during the hospital encounter of 02/27/25 (from the past 24 hours)   UA with Microscopic reflex to Culture    Specimen: Urine, Clean Catch   Result Value Ref Range    Color Urine Yellow Colorless, Straw, Light Yellow, Yellow    Appearance Urine Clear Clear    Glucose Urine Negative Negative mg/dL    Bilirubin Urine Negative Negative    Ketones Urine Trace (A) Negative mg/dL    Specific Gravity Urine 1.028 1.001 - 1.030    Blood Urine Negative Negative    pH Urine 7.0 5.0 - 7.0    Protein Albumin Urine 30 (A) Negative mg/dL    Urobilinogen Urine <2.0 <2.0 mg/dL    Nitrite Urine Negative Negative    Leukocyte Esterase Urine Negative Negative    Mucus Urine Present (A) None Seen /LPF    RBC Urine <1 <=2 /HPF    WBC Urine 5 <=5 /HPF    Squamous Epithelials Urine <1 <=1 /HPF    Narrative    Urine Culture not indicated   CBC (+ platelets, no diff)   Result " Value Ref Range    WBC Count 15.9 (H) 4.0 - 11.0 10e3/uL    RBC Count 5.33 4.40 - 5.90 10e6/uL    Hemoglobin 14.7 13.3 - 17.7 g/dL    Hematocrit 42.7 40.0 - 53.0 %    MCV 80 78 - 100 fL    MCH 27.6 26.5 - 33.0 pg    MCHC 34.4 31.5 - 36.5 g/dL    RDW 13.0 10.0 - 15.0 %    Platelet Count 222 150 - 450 10e3/uL   Basic metabolic panel   Result Value Ref Range    Sodium 137 135 - 145 mmol/L    Potassium 3.9 3.4 - 5.3 mmol/L    Chloride 101 98 - 107 mmol/L    Carbon Dioxide (CO2) 29 22 - 29 mmol/L    Anion Gap 7 7 - 15 mmol/L    Urea Nitrogen 11.5 6.0 - 20.0 mg/dL    Creatinine 1.04 0.67 - 1.17 mg/dL    GFR Estimate >90 >60 mL/min/1.73m2    Calcium 10.5 (H) 8.8 - 10.4 mg/dL    Glucose 134 (H) 70 - 99 mg/dL   Hepatic panel   Result Value Ref Range    Protein Total 8.3 6.4 - 8.3 g/dL    Albumin 4.9 3.5 - 5.2 g/dL    Bilirubin Total 0.6 <=1.2 mg/dL    Alkaline Phosphatase 54 40 - 150 U/L    AST 26 0 - 45 U/L    ALT 17 0 - 70 U/L    Bilirubin Direct 0.25 0.00 - 0.30 mg/dL   Lipase   Result Value Ref Range    Lipase 30 13 - 60 U/L   CT Abdomen Pelvis w Contrast    Narrative    EXAM: CT ABDOMEN PELVIS W CONTRAST  LOCATION: [facility]  DATE: 2/27/2025    INDICATION: Abdominal pain, diffuse, mainly in the upper abdomen.  COMPARISON: None.  TECHNIQUE: CT scan of the abdomen and pelvis was performed following injection of IV contrast. Multiplanar reformats were obtained. Dose reduction techniques were used.  CONTRAST: Isovue-370, 90 mL.    FINDINGS:   LOWER CHEST: Normal.    HEPATOBILIARY: Tiny cyst in the inferior right hepatic lobe.    PANCREAS: Normal.    SPLEEN: Normal.    ADRENAL GLANDS: Normal.    KIDNEYS/BLADDER: Normal.    BOWEL: Wall thickening involving a loop of jejunum in the left midabdomen with mild upstream small bowel distention compatible with early or partial small bowel obstruction. At the site of wall thickening there is mild transition and it is unclear   whether this is due to a  primary enteritis or could be reactive wall thickening in relation to a band or adhesion. Mild mesenteric congestion within the left side of the abdomen.    LYMPH NODES: Mildly enlarged mesenteric lymph nodes compatible with reactive nodes.    VASCULATURE: Normal.    PELVIC ORGANS: Trace ascites.    MUSCULOSKELETAL: Normal.      Impression    IMPRESSION:   1.  Wall thickening involving a loop of jejunum in the left midabdomen with mild upstream small bowel distention compatible with early or partial small bowel obstruction. A site of wall thickening is present and it is unclear whether this is due to a   primary enteritis or could be reactive wall thickening in relation to a band or adhesion. Mild mesenteric congestion within the left side of the abdomen.   US Abdomen Limited    Narrative    EXAM: US ABDOMEN LIMITED  LOCATION: [facility]  DATE: 2/27/2025    INDICATION: upper abdominal pain, tenderness ruq  COMPARISON: None.  TECHNIQUE: Limited abdominal ultrasound.    FINDINGS:    GALLBLADDER: Negative for gallstones. Gallbladder mostly contracted.    BILE DUCTS: No biliary dilatation. The common duct measures 4 mm.    LIVER: Normal parenchyma with smooth contour. No focal mass. The portal vein is patent with flow in the normal direction.    RIGHT KIDNEY: No hydronephrosis.    PANCREAS: The visualized portions are normal.    No ascites.      Impression    IMPRESSION:  1.  Normal limited abdominal ultrasound.       XR Chest Port 1 View    Narrative    EXAM: XR CHEST PORT 1 VIEW  LOCATION: [facility]  DATE: 2/27/2025    INDICATION: NG tube placement  COMPARISON: None.      Impression    IMPRESSION: Enteric tube within the stomach. Heart normal in size. Lungs are clear.   Basic metabolic panel   Result Value Ref Range    Sodium 139 135 - 145 mmol/L    Potassium 3.9 3.4 - 5.3 mmol/L    Chloride 104 98 - 107 mmol/L    Carbon Dioxide (CO2) 29 22 - 29 mmol/L    Anion Gap  6 (L) 7 - 15 mmol/L    Urea Nitrogen 9.1 6.0 - 20.0 mg/dL    Creatinine 1.21 (H) 0.67 - 1.17 mg/dL    GFR Estimate 79 >60 mL/min/1.73m2    Calcium 9.4 8.8 - 10.4 mg/dL    Glucose 99 70 - 99 mg/dL   CBC with platelets   Result Value Ref Range    WBC Count 9.9 4.0 - 11.0 10e3/uL    RBC Count 4.89 4.40 - 5.90 10e6/uL    Hemoglobin 13.7 13.3 - 17.7 g/dL    Hematocrit 39.2 (L) 40.0 - 53.0 %    MCV 80 78 - 100 fL    MCH 28.0 26.5 - 33.0 pg    MCHC 34.9 31.5 - 36.5 g/dL    RDW 13.1 10.0 - 15.0 %    Platelet Count 186 150 - 450 10e3/uL           Jenifer Merchant PA-C  [facility]  [address]  [place] [address]   [facility] [phone]

## 2025-02-28 NOTE — ED PROVIDER NOTES
EMERGENCY DEPARTMENT ENCOUNTER      NAME: Peter Borden  AGE: 39 year old male  YOB: 1985  MRN: [id number]  EVALUATION DATE & TIME: No admission date for patient encounter.    PCP: No Ref-Primary, Physician    ED PROVIDER: Chana Young PA-C      Chief Complaint   Patient presents with    Abdominal Pain         FINAL IMPRESSION:  1. Small bowel obstruction (H)        MEDICAL DECISION MAKING:    Pertinent Labs & Imaging studies reviewed. (See chart for details)  39 year old male presents to the Emergency Department for evaluation of abdominal pain that started around 10 AM.  On my evaluation, patient vitally stable but does appear in pain.  Examination with epigastric and right upper quadrant abdominal tenderness to palpation.  No rebound or guarding.  Heart regular rate and rhythm and lungs to auscultation bilaterally.  Differential diagnosis included obstruction, gastroenteritis, cholecystitis, pancreatitis, kidney stone.    UA without signs of infection.  CBC with elevated white blood cell count of 15 but no other significant derangements.  BMP without significant derangement.  LFTs and lipase without significant derangement.  After Toradol, Pepcid and Maalox patient feeling significantly improved.  CT of the abdomen pelvis shows a small bowel obstruction.  Significant distention of the stomach and we will place an NG tube.  I did obtain an ultrasound as well as the right upper quadrant which is negative.  At this time, will search for a bed in the system for transfer as we are boarding here in our emergency department.  Discussed with patient he was in agreement understanding.  At this time, symptoms are well-controlled and we will place NG tube and transfer in system if available and patient willing otherwise, will board here in our emergency department for admission to the hospital.  Patient signed out to my colleague Dr. Steven pending disposition.    Medical Decision Making  Obtained  supplemental history:Supplemental history obtained?: No  Reviewed external records: External records reviewed?: No  Care impacted by chronic illness:Documented in Chart  Did you consider but not order tests?: Work up considered but not performed and documented in chart, if applicable  Did you interpret images independently?: Independent interpretation of ECG and images noted in documentation, when applicable.  Consultation discussion with other provider:Did you involve another provider (consultant, , pharmacy, etc.)?: I discussed the care with another health care provider, see documentation for details.  Admit.    MIPS (CTPE, Dental pain, Camacho, Sinusitis, Asthma/COPD, Head Trauma): Not Applicable       ED COURSE:  5:30 PM I met with the patient, obtained history, performed an initial exam, and discussed options and plan for diagnostics and treatment here in the ED.    7:30 PM I staffed the patient with Dr. Steven.     7:54 PM updated patient on results and plan of care and he was in agreement understanding.    7:54 PM patient signed out to Dr. Steven pending disposition (transfer vs boarding).     At the conclusion of the encounter I discussed the results of all of the tests and the disposition. The questions were answered. The patient or family acknowledged understanding and was agreeable with the care plan.     MEDICATIONS GIVEN IN THE EMERGENCY:  Medications   iopamidol (ISOVUE-370) solution 90 mL (has no administration in time range)   ketorolac (TORADOL) injection 15 mg (15 mg Intravenous $Given 2/27/25 1751)   famotidine (PEPCID) injection 20 mg (20 mg Intravenous $Given 2/27/25 1752)   alum & mag hydroxide-simethicone (MAALOX) suspension 15 mL (15 mLs Oral $Given 2/27/25 1751)   iopamidol (ISOVUE-370) solution 90 mL (90 mLs Intravenous $Given 2/27/25 5064)       NEW PRESCRIPTIONS STARTED AT TODAY'S ER VISIT  New Prescriptions    No medications on file             =================================================================    HPI:    Patient information was obtained from: The patient    Use of Interpretor: N/A         Peter Borden is a 39 year old male who presents to this ED via private vehicle for evaluation of abdominal pain started around 10 AM.  On my evaluation, patient denies any nausea, vomiting, diarrhea.  He was feeling fine this morning when he woke up.  No fevers or chills.  He denies any history of abdominal surgeries.  He has never had pain like this in the past.  No chest pain or difficulty breathing.  No other concerns voiced.    REVIEW OF SYSTEMS:  Negative unless otherwise stated in the above HPI.       PAST MEDICAL HISTORY:  Past Medical History:   Diagnosis Date    Anxiety 5/22/2017    GERD (gastroesophageal reflux disease)        PAST SURGICAL HISTORY:  Past Surgical History:   Procedure Laterality Date    NO HISTORY OF SURGERY             CURRENT MEDICATIONS:      Current Facility-Administered Medications:     iopamidol (ISOVUE-370) solution 90 mL, 90 mL, Intravenous, Once, Chana Young PA-C    Current Outpatient Medications:     clindamycin (CLEOCIN T) 1 % external lotion, Apply topically 2 times daily, Disp: 60 mL, Rfl: 5    fluocinolone acetonide (DERMA SMOOTHE/FS BODY) 0.01 % external oil, Apply topically 2 times daily, Disp: 120 mL, Rfl: 2    ketoconazole (NIZORAL) 2 % external shampoo, , Disp: , Rfl:     omeprazole (PRILOSEC) 40 MG DR capsule, Take 1 capsule (40 mg) by mouth daily (Patient not taking: Reported on 2/3/2022), Disp: 60 capsule, Rfl: 0      ALLERGIES:  No Known Allergies    FAMILY HISTORY:  Family History   Problem Relation Age of Onset    No Known Problems Mother     No Known Problems Father        SOCIAL HISTORY:   Social History     Socioeconomic History    Marital status:     Number of children: 1   Occupational History     Comment:  at [employer]   Tobacco Use    Smoking status: Never     "Smokeless tobacco: Never   Vaping Use    Vaping status: Never Used   Substance and Sexual Activity    Alcohol use: Yes     Comment: social, minimal     Drug use: No    Sexual activity: Yes     Partners: Female     Birth control/protection: None   Social History Narrative    From [place]. 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  Currently        VITALS:  Patient Vitals for the past 24 hrs:   BP Temp Temp src Pulse Resp SpO2 Height Weight   02/27/25 1629 129/77 98.1  F (36.7  C) Oral 78 16 97 % 1.753 m (5' 9\") 88.9 kg (196 lb)       PHYSICAL EXAM    Constitutional: Well developed, Well nourished, NAD  HENT: Normocephalic, Atraumatic, Bilateral external ears normal, Oropharynx normal, mucous membranes moist, Nose normal.   Neck: Normal range of motion, No tenderness, Supple, No stridor.  Eyes: Eyes track normally throughout exam, Conjunctiva normal, No discharge.   Respiratory: Normal breath sounds, No respiratory distress, No wheezing, Speaks full sentences easily. No cough.  Cardiovascular: Normal heart rate, Regular rhythm, No murmurs, No rubs, No gallops. Chest wall nontender.  GI: Soft, tenderness to palpation in the epigastrium and right upper quadrant, No masses, No flank tenderness. No rebound or guarding.  Musculoskeletal: Good range of motion in all major joints.   Integument: Warm, Dry, No erythema, No rash. No petechiae.  Neurologic: Alert & oriented x 3, Normal motor function, Normal sensory function, No focal deficits noted. Normal gait.  Psychiatric: Affect normal, Judgment normal, Mood normal. Cooperative.    LAB:  All pertinent labs reviewed and interpreted.  Recent Results (from the past 24 hours)   UA with Microscopic reflex to Culture    Collection Time: 02/27/25  5:39 PM    Specimen: Urine, Clean Catch   Result Value Ref Range    Color Urine Yellow Colorless, Straw, Light Yellow, Yellow    Appearance Urine Clear Clear    Glucose Urine " Negative Negative mg/dL    Bilirubin Urine Negative Negative    Ketones Urine Trace (A) Negative mg/dL    Specific Gravity Urine 1.028 1.001 - 1.030    Blood Urine Negative Negative    pH Urine 7.0 5.0 - 7.0    Protein Albumin Urine 30 (A) Negative mg/dL    Urobilinogen Urine <2.0 <2.0 mg/dL    Nitrite Urine Negative Negative    Leukocyte Esterase Urine Negative Negative    Mucus Urine Present (A) None Seen /LPF    RBC Urine <1 <=2 /HPF    WBC Urine 5 <=5 /HPF    Squamous Epithelials Urine <1 <=1 /HPF   CBC (+ platelets, no diff)    Collection Time: 02/27/25  5:46 PM   Result Value Ref Range    WBC Count 15.9 (H) 4.0 - 11.0 10e3/uL    RBC Count 5.33 4.40 - 5.90 10e6/uL    Hemoglobin 14.7 13.3 - 17.7 g/dL    Hematocrit 42.7 40.0 - 53.0 %    MCV 80 78 - 100 fL    MCH 27.6 26.5 - 33.0 pg    MCHC 34.4 31.5 - 36.5 g/dL    RDW 13.0 10.0 - 15.0 %    Platelet Count 222 150 - 450 10e3/uL   Basic metabolic panel    Collection Time: 02/27/25  5:46 PM   Result Value Ref Range    Sodium 137 135 - 145 mmol/L    Potassium 3.9 3.4 - 5.3 mmol/L    Chloride 101 98 - 107 mmol/L    Carbon Dioxide (CO2) 29 22 - 29 mmol/L    Anion Gap 7 7 - 15 mmol/L    Urea Nitrogen 11.5 6.0 - 20.0 mg/dL    Creatinine 1.04 0.67 - 1.17 mg/dL    GFR Estimate >90 >60 mL/min/1.73m2    Calcium 10.5 (H) 8.8 - 10.4 mg/dL    Glucose 134 (H) 70 - 99 mg/dL   Hepatic panel    Collection Time: 02/27/25  5:46 PM   Result Value Ref Range    Protein Total 8.3 6.4 - 8.3 g/dL    Albumin 4.9 3.5 - 5.2 g/dL    Bilirubin Total 0.6 <=1.2 mg/dL    Alkaline Phosphatase 54 40 - 150 U/L    AST 26 0 - 45 U/L    ALT 17 0 - 70 U/L    Bilirubin Direct 0.25 0.00 - 0.30 mg/dL   Lipase    Collection Time: 02/27/25  5:46 PM   Result Value Ref Range    Lipase 30 13 - 60 U/L         RADIOLOGY:  Reviewed all pertinent imaging. Please see official radiology report.  US Abdomen Limited   Final Result   IMPRESSION:   1.  Normal limited abdominal ultrasound.            CT Abdomen Pelvis w  Contrast   Final Result   IMPRESSION:    1.  Wall thickening involving a loop of jejunum in the left midabdomen with mild upstream small bowel distention compatible with early or partial small bowel obstruction. A site of wall thickening is present and it is unclear whether this is due to a    primary enteritis or could be reactive wall thickening in relation to a band or adhesion. Mild mesenteric congestion within the left side of the abdomen.          PROCEDURES:   None    Chana Young PA-C  Emergency Medicine  [facility]  2/27/2025      Chana Young PA-C  02/27/25 1956

## 2025-07-19 ENCOUNTER — HEALTH MAINTENANCE LETTER (OUTPATIENT)
Age: 40
End: 2025-07-19